# Patient Record
Sex: FEMALE | Race: WHITE | Employment: OTHER | URBAN - METROPOLITAN AREA
[De-identification: names, ages, dates, MRNs, and addresses within clinical notes are randomized per-mention and may not be internally consistent; named-entity substitution may affect disease eponyms.]

---

## 2020-02-01 ENCOUNTER — APPOINTMENT (OUTPATIENT)
Dept: GENERAL RADIOLOGY | Facility: CLINIC | Age: 75
DRG: 482 | End: 2020-02-01
Attending: EMERGENCY MEDICINE
Payer: COMMERCIAL

## 2020-02-01 ENCOUNTER — HOSPITAL ENCOUNTER (INPATIENT)
Facility: CLINIC | Age: 75
LOS: 3 days | Discharge: HOME HEALTH | DRG: 482 | End: 2020-02-04
Attending: EMERGENCY MEDICINE | Admitting: INTERNAL MEDICINE
Payer: COMMERCIAL

## 2020-02-01 DIAGNOSIS — S72.002A CLOSED FRACTURE OF NECK OF LEFT FEMUR, INITIAL ENCOUNTER (H): ICD-10-CM

## 2020-02-01 LAB
ANION GAP SERPL CALCULATED.3IONS-SCNC: 7 MMOL/L (ref 3–14)
APTT PPP: 28 SEC (ref 22–37)
BASOPHILS # BLD AUTO: 0.1 10E9/L (ref 0–0.2)
BASOPHILS NFR BLD AUTO: 0.7 %
BUN SERPL-MCNC: 22 MG/DL (ref 7–30)
CALCIUM SERPL-MCNC: 8.8 MG/DL (ref 8.5–10.1)
CHLORIDE SERPL-SCNC: 107 MMOL/L (ref 94–109)
CO2 SERPL-SCNC: 23 MMOL/L (ref 20–32)
CREAT SERPL-MCNC: 0.63 MG/DL (ref 0.52–1.04)
DIFFERENTIAL METHOD BLD: NORMAL
EOSINOPHIL # BLD AUTO: 0.1 10E9/L (ref 0–0.7)
EOSINOPHIL NFR BLD AUTO: 0.8 %
ERYTHROCYTE [DISTWIDTH] IN BLOOD BY AUTOMATED COUNT: 12.9 % (ref 10–15)
GFR SERPL CREATININE-BSD FRML MDRD: 88 ML/MIN/{1.73_M2}
GLUCOSE SERPL-MCNC: 111 MG/DL (ref 70–99)
HCT VFR BLD AUTO: 37.9 % (ref 35–47)
HGB BLD-MCNC: 12.2 G/DL (ref 11.7–15.7)
IMM GRANULOCYTES # BLD: 0 10E9/L (ref 0–0.4)
IMM GRANULOCYTES NFR BLD: 0.3 %
INR PPP: 0.98 (ref 0.86–1.14)
LYMPHOCYTES # BLD AUTO: 1 10E9/L (ref 0.8–5.3)
LYMPHOCYTES NFR BLD AUTO: 11.1 %
MCH RBC QN AUTO: 29 PG (ref 26.5–33)
MCHC RBC AUTO-ENTMCNC: 32.2 G/DL (ref 31.5–36.5)
MCV RBC AUTO: 90 FL (ref 78–100)
MONOCYTES # BLD AUTO: 0.6 10E9/L (ref 0–1.3)
MONOCYTES NFR BLD AUTO: 6.8 %
NEUTROPHILS # BLD AUTO: 7 10E9/L (ref 1.6–8.3)
NEUTROPHILS NFR BLD AUTO: 80.3 %
NRBC # BLD AUTO: 0 10*3/UL
NRBC BLD AUTO-RTO: 0 /100
PLATELET # BLD AUTO: 320 10E9/L (ref 150–450)
POTASSIUM SERPL-SCNC: 3.7 MMOL/L (ref 3.4–5.3)
RBC # BLD AUTO: 4.2 10E12/L (ref 3.8–5.2)
SODIUM SERPL-SCNC: 137 MMOL/L (ref 133–144)
WBC # BLD AUTO: 8.7 10E9/L (ref 4–11)

## 2020-02-01 PROCEDURE — 85730 THROMBOPLASTIN TIME PARTIAL: CPT | Performed by: EMERGENCY MEDICINE

## 2020-02-01 PROCEDURE — 73552 X-RAY EXAM OF FEMUR 2/>: CPT | Mod: LT

## 2020-02-01 PROCEDURE — 72170 X-RAY EXAM OF PELVIS: CPT

## 2020-02-01 PROCEDURE — 93005 ELECTROCARDIOGRAM TRACING: CPT

## 2020-02-01 PROCEDURE — 25800030 ZZH RX IP 258 OP 636: Performed by: INTERNAL MEDICINE

## 2020-02-01 PROCEDURE — 25000132 ZZH RX MED GY IP 250 OP 250 PS 637: Performed by: INTERNAL MEDICINE

## 2020-02-01 PROCEDURE — 40000275 ZZH STATISTIC RCP TIME EA 10 MIN

## 2020-02-01 PROCEDURE — 93010 ELECTROCARDIOGRAM REPORT: CPT | Performed by: INTERNAL MEDICINE

## 2020-02-01 PROCEDURE — 25000128 H RX IP 250 OP 636: Performed by: INTERNAL MEDICINE

## 2020-02-01 PROCEDURE — 84484 ASSAY OF TROPONIN QUANT: CPT | Performed by: INTERNAL MEDICINE

## 2020-02-01 PROCEDURE — 12000000 ZZH R&B MED SURG/OB

## 2020-02-01 PROCEDURE — 80048 BASIC METABOLIC PNL TOTAL CA: CPT | Performed by: EMERGENCY MEDICINE

## 2020-02-01 PROCEDURE — 99222 1ST HOSP IP/OBS MODERATE 55: CPT | Mod: AI | Performed by: INTERNAL MEDICINE

## 2020-02-01 PROCEDURE — 25000132 ZZH RX MED GY IP 250 OP 250 PS 637: Performed by: EMERGENCY MEDICINE

## 2020-02-01 PROCEDURE — 85610 PROTHROMBIN TIME: CPT | Performed by: EMERGENCY MEDICINE

## 2020-02-01 PROCEDURE — 85025 COMPLETE CBC W/AUTO DIFF WBC: CPT | Performed by: EMERGENCY MEDICINE

## 2020-02-01 PROCEDURE — 99285 EMERGENCY DEPT VISIT HI MDM: CPT | Mod: 25

## 2020-02-01 RX ORDER — LIDOCAINE 40 MG/G
CREAM TOPICAL
Status: DISCONTINUED | OUTPATIENT
Start: 2020-02-01 | End: 2020-02-02

## 2020-02-01 RX ORDER — ONDANSETRON 4 MG/1
4 TABLET, ORALLY DISINTEGRATING ORAL EVERY 6 HOURS PRN
Status: DISCONTINUED | OUTPATIENT
Start: 2020-02-01 | End: 2020-02-02

## 2020-02-01 RX ORDER — IBUPROFEN 200 MG
800 TABLET ORAL EVERY 8 HOURS PRN
COMMUNITY

## 2020-02-01 RX ORDER — SODIUM CHLORIDE 9 MG/ML
INJECTION, SOLUTION INTRAVENOUS CONTINUOUS
Status: DISCONTINUED | OUTPATIENT
Start: 2020-02-01 | End: 2020-02-01

## 2020-02-01 RX ORDER — CODEINE PHOSPHATE AND GUAIFENESIN 10; 100 MG/5ML; MG/5ML
10 SOLUTION ORAL EVERY 4 HOURS PRN
Status: ON HOLD | COMMUNITY
Start: 2020-01-27 | End: 2020-02-04

## 2020-02-01 RX ORDER — DEXTROSE, SODIUM CHLORIDE, AND POTASSIUM CHLORIDE 5; .45; .15 G/100ML; G/100ML; G/100ML
500 INJECTION INTRAVENOUS ONCE
Status: DISCONTINUED | OUTPATIENT
Start: 2020-02-01 | End: 2020-02-01

## 2020-02-01 RX ORDER — ONDANSETRON 2 MG/ML
4 INJECTION INTRAMUSCULAR; INTRAVENOUS EVERY 6 HOURS PRN
Status: DISCONTINUED | OUTPATIENT
Start: 2020-02-01 | End: 2020-02-02

## 2020-02-01 RX ORDER — OXYCODONE HYDROCHLORIDE 5 MG/1
5-10 TABLET ORAL
Status: DISCONTINUED | OUTPATIENT
Start: 2020-02-01 | End: 2020-02-02

## 2020-02-01 RX ORDER — DEXTROSE MONOHYDRATE, SODIUM CHLORIDE, AND POTASSIUM CHLORIDE 50; 1.49; 4.5 G/1000ML; G/1000ML; G/1000ML
INJECTION, SOLUTION INTRAVENOUS CONTINUOUS
Status: DISCONTINUED | OUTPATIENT
Start: 2020-02-01 | End: 2020-02-02

## 2020-02-01 RX ORDER — NALOXONE HYDROCHLORIDE 0.4 MG/ML
.1-.4 INJECTION, SOLUTION INTRAMUSCULAR; INTRAVENOUS; SUBCUTANEOUS
Status: DISCONTINUED | OUTPATIENT
Start: 2020-02-01 | End: 2020-02-02

## 2020-02-01 RX ORDER — HYDROMORPHONE HYDROCHLORIDE 1 MG/ML
.3-.5 INJECTION, SOLUTION INTRAMUSCULAR; INTRAVENOUS; SUBCUTANEOUS
Status: DISCONTINUED | OUTPATIENT
Start: 2020-02-01 | End: 2020-02-02

## 2020-02-01 RX ORDER — ACETAMINOPHEN 500 MG
1000 TABLET ORAL ONCE
Status: COMPLETED | OUTPATIENT
Start: 2020-02-01 | End: 2020-02-01

## 2020-02-01 RX ORDER — DOXYCYCLINE 100 MG/1
100 CAPSULE ORAL 2 TIMES DAILY
Status: ON HOLD | COMMUNITY
End: 2020-02-04

## 2020-02-01 RX ADMIN — POTASSIUM CHLORIDE, DEXTROSE MONOHYDRATE AND SODIUM CHLORIDE: 150; 5; 450 INJECTION, SOLUTION INTRAVENOUS at 14:28

## 2020-02-01 RX ADMIN — OXYCODONE HYDROCHLORIDE 5 MG: 5 TABLET ORAL at 19:00

## 2020-02-01 RX ADMIN — HYDROMORPHONE HYDROCHLORIDE 0.3 MG: 1 INJECTION, SOLUTION INTRAMUSCULAR; INTRAVENOUS; SUBCUTANEOUS at 22:14

## 2020-02-01 RX ADMIN — ONDANSETRON HYDROCHLORIDE 4 MG: 2 INJECTION, SOLUTION INTRAMUSCULAR; INTRAVENOUS at 20:21

## 2020-02-01 RX ADMIN — HYDROMORPHONE HYDROCHLORIDE 0.5 MG: 1 INJECTION, SOLUTION INTRAMUSCULAR; INTRAVENOUS; SUBCUTANEOUS at 17:02

## 2020-02-01 RX ADMIN — ACETAMINOPHEN 1000 MG: 500 TABLET, FILM COATED ORAL at 11:37

## 2020-02-01 RX ADMIN — HYDROMORPHONE HYDROCHLORIDE 0.5 MG: 1 INJECTION, SOLUTION INTRAMUSCULAR; INTRAVENOUS; SUBCUTANEOUS at 19:48

## 2020-02-01 ASSESSMENT — ACTIVITIES OF DAILY LIVING (ADL)
TOILETING: 0-->INDEPENDENT
WHICH_OF_THE_ABOVE_FUNCTIONAL_RISKS_HAD_A_RECENT_ONSET_OR_CHANGE?: AMBULATION;TRANSFERRING;TOILETING;BATHING;DRESSING
RETIRED_EATING: 0-->INDEPENDENT
AMBULATION: 0-->INDEPENDENT
COGNITION: 0 - NO COGNITION ISSUES REPORTED
ADLS_ACUITY_SCORE: 11
SWALLOWING: 0-->SWALLOWS FOODS/LIQUIDS WITHOUT DIFFICULTY
DRESS: 0-->INDEPENDENT
NUMBER_OF_TIMES_PATIENT_HAS_FALLEN_WITHIN_LAST_SIX_MONTHS: 1
RETIRED_COMMUNICATION: 0-->UNDERSTANDS/COMMUNICATES WITHOUT DIFFICULTY
BATHING: 0-->INDEPENDENT
FALL_HISTORY_WITHIN_LAST_SIX_MONTHS: YES
ADLS_ACUITY_SCORE: 13
TRANSFERRING: 0-->INDEPENDENT

## 2020-02-01 ASSESSMENT — ENCOUNTER SYMPTOMS
ABDOMINAL PAIN: 0
BACK PAIN: 0
NECK PAIN: 0

## 2020-02-01 NOTE — H&P
[facility] History and Physical    Kirti Ng MRN# [id number]   Age: 74 year old YOB: 1945     Date of Admission:  2/1/2020    Home clinic: [place], [place]          Assessment and Plan:   Assessment:   Kirti Ng is a fundamentally healthy woman from [place], [place] who is visitngg family and fell in the garage.  She sustained a left femoral neck fracture for which [facility] is now admitted in anticipation of surgical repair.      PMH is notable for the absence of major medical issues.  She has never undergone anesthetic and does not take any scheduled medications.  She does smoke but has never been told that she has lung disease.  She does tolerate daily exercise and has no hx exercise-induced CP, syncope or limiting HERNÁNDEZ.     Evaluation to this point indicates the patient is quite low risk for general endotracheal anesthetic.  She does smoke but has no known lung disease.  She has no problems with normal range of motion of her neck.  She does not wear dentures.  She has not had eye surgery but reports cataracts.  Examination of her heart, lungs and abdomen are normal without remarkable findings.  EKG shows a normal sinus rhythm with normal axes and intervals.  Creatinine 0.63, hemoglobin 12.2, white count 8.7, INR 0.98.    Dx:  1.  Left femoral neck fracture due to mechanical fall.   2.  Risks of perioperative cardiac complications related to general endotracheal anesthetic include no prior exposure but really nothing else.      Plan:   1.  Admit to inpatient.  2.  Orthopedic consultation has been obtained.  3.  N.p.o. after midnight.             Chief Complaint:   Fall, unable to bear weight on the left lower extremity     History is obtained from the patient, emergency room physician and electronic medical record.    Ms. Ng is reportedly visiting family from [facility].  She has generally been feeling very well and has not had any medical concerns recently.  She apparently  stepped on something in the garage and lost her footing.  She landed on her left side and was unable to bear weight on her left lower extremity.    In the emergency department, the patient is noted to have severe pain with any attempt at movement of the left lower extremity.  X-rays confirmed presence of a femoral neck fracture.          Past Medical History:   Ms. Ng denies major medical disease.  She does not take any medications on a regular basis.           Past Surgical History:   Denies prior surgeries.          Social History:     Social History     Tobacco Use     Smoking status: Not on file   Substance Use Topics     Alcohol use: Not on file             Family History:   Patient reports that her sister had exposure to general endotracheal anesthetic and had no difficulty with it.         Allergies:   No Known Allergies          Medications:     Medications Prior to Admission   Medication Sig Dispense Refill Last Dose     doxycycline monohydrate (MONODOX) 100 MG capsule Take 100 mg by mouth 2 times daily For 10 days. 2 doses remaining   1/31/2020 at pm     guaiFENesin-codeine (ROBITUSSIN AC) 100-10 MG/5ML solution Take 10 mLs by mouth every 4 hours as needed   1/31/2020 at am     ibuprofen (ADVIL/MOTRIN) 200 MG tablet Take 800 mg by mouth every 8 hours as needed for mild pain or moderate pain   2/1/2020 at am             Review of Systems:   A comprehensive review of systems was performed and found to be negative except as described in this note           Physical Exam:   Vitals were reviewed  Temp: 99.2  F (37.3  C) Temp src: Oral BP: (!) 146/59 Pulse: 75   Resp: 16 SpO2: 98 % O2 Device: None (Room air)    Constitutional: Awake, alert, cooperative, no apparent distress, and appears stated age.  Thin, elderly  female.  Fully appropriate.  Eyes: Lids and lashes normal, pupils equal, round and reactive to light, extra ocular muscles intact, sclera clear, conjunctiva normal.  ENT: Normocephalic,  without obvious abnormality, atraumatic.  Oral pharynx with moist mucus membranes, gums normal and good dentition.  Neck: Supple, symmetrical, trachea midline, no adenopathy, thyroid symmetric, not enlarged and no tenderness, skin normal.  Hematologic / Lymphatic: No cervical lymphadenopathy and no supraclavicular lymphadenopathy.  Lungs: No increased work of breathing, good air exchange, clear to auscultation bilaterally, no crackles or wheezing.  No significantly prolonged expiratory phase.  Cardiovascular: Regular rate and rhythm, normal S1 and S2, no S3 or S4, and no murmur noted.  Abdomen: No scars, normal bowel sounds, soft, non-distended, non-tender, no masses palpated, no hepatosplenomegaly.  Musculoskeletal: No redness, warmth, or swelling of the joints.  Tone is normal.  Neurologic: Awake, alert, oriented to name, place and time.  Cranial nerves II-XII are grossly intact.   Neuropsychiatric: Normal affect, mood, orientation, memory and insight.  Skin: No rashes, erythema, pallor, petechia or purpura.          Data:     Results for orders placed or performed during the hospital encounter of 02/01/20 (from the past 24 hour(s))   XR Femur Left 2 Views    Narrative    FEMUR TWO VIEWS LEFT  2/1/2020 12:08 PM     HISTORY: pain, eval for fracture    COMPARISON: None.      Impression    IMPRESSION: Acute nondisplaced left femoral neck fracture with  impaction laterally. There is normal hip joint spacing and alignment.  Osteopenia. Vascular calcification.    ARIAN KRUSE MD   XR Pelvis 1/2 Views    Narrative    XR PELVIS 1/2 VW   2/1/2020 12:10 PM     HISTORY:  pain, eval for fracture hip    COMPARISON: None      Impression    IMPRESSION: Acute nondisplaced left femoral neck fracture. There is  normal hip joint spacing and alignment. Mild degenerative changes of  the sacroiliac joints and lumbar spine. Vascular calcification.    ARIAN KRUSE MD   CBC with platelets differential   Result Value Ref Range     WBC 8.7 4.0 - 11.0 10e9/L    RBC Count 4.20 3.8 - 5.2 10e12/L    Hemoglobin 12.2 11.7 - 15.7 g/dL    Hematocrit 37.9 35.0 - 47.0 %    MCV 90 78 - 100 fl    MCH 29.0 26.5 - 33.0 pg    MCHC 32.2 31.5 - 36.5 g/dL    RDW 12.9 10.0 - 15.0 %    Platelet Count 320 150 - 450 10e9/L    Diff Method Automated Method     % Neutrophils 80.3 %    % Lymphocytes 11.1 %    % Monocytes 6.8 %    % Eosinophils 0.8 %    % Basophils 0.7 %    % Immature Granulocytes 0.3 %    Nucleated RBCs 0 0 /100    Absolute Neutrophil 7.0 1.6 - 8.3 10e9/L    Absolute Lymphocytes 1.0 0.8 - 5.3 10e9/L    Absolute Monocytes 0.6 0.0 - 1.3 10e9/L    Absolute Eosinophils 0.1 0.0 - 0.7 10e9/L    Absolute Basophils 0.1 0.0 - 0.2 10e9/L    Abs Immature Granulocytes 0.0 0 - 0.4 10e9/L    Absolute Nucleated RBC 0.0    INR   Result Value Ref Range    INR 0.98 0.86 - 1.14   Basic metabolic panel   Result Value Ref Range    Sodium 137 133 - 144 mmol/L    Potassium 3.7 3.4 - 5.3 mmol/L    Chloride 107 94 - 109 mmol/L    Carbon Dioxide 23 20 - 32 mmol/L    Anion Gap 7 3 - 14 mmol/L    Glucose 111 (H) 70 - 99 mg/dL    Urea Nitrogen 22 7 - 30 mg/dL    Creatinine 0.63 0.52 - 1.04 mg/dL    GFR Estimate 88 >60 mL/min/[1.73_m2]    GFR Estimate If Black >90 >60 mL/min/[1.73_m2]    Calcium 8.8 8.5 - 10.1 mg/dL   Partial thromboplastin time   Result Value Ref Range    PTT 28 22 - 37 sec   Orthopedic Surgery IP Consult: Patient to be seen: Routine within 24 hrs; left femoral neck fracture; Consultant may enter orders: Yes; Requesting provider? Hospitalist (if different from attending physician)    Karlos Woods MD     2/1/2020  3:09 PM  Chart and xray reviewed  Impacted FNF  A/P  I will see in am  I will speak w family tonight if possible by phone  Plan ORIF vs CRUZITO (lean ORIF as the fx is well aligned)   EKG 12-lead, tracing only   Result Value Ref Range    Interpretation ECG Click View Image link to view waveform and result       EKG results:   Performed  today        Normal sinus rhythm, normal axis, no acute ischemic ST segment or T wave changes      All imaging studies reviewed by me.      Attestation:  I have reviewed today's vital signs, notes, medications, labs and imaging.  Total time: 30 minutes     Rk Monroe MD

## 2020-02-01 NOTE — ED PROVIDER NOTES
History     Chief Complaint:  Fall      HPI   Kirti Ng is a 74 year old female who presents with fall. The patient notes that she was outside walking in the garage and had tripped over something and fell and landed on her left leg and hip. She denies hitting head, no chest abdominal, neck or back pain, or loss of consciousness. She states that she was not able to walk. She denies use of anticoagulants.     Allergies:  No known drug allergies     Medications:    Robitussin    Past Medical History:    The patient does not have any past pertinent medical history.     Past Surgical History:    The patient does not have any pertinent past surgical history.     Family History:    No past pertinent family history.     Social History:  Smoking status: Current every day smoker  Alcohol use: No  Drug use: No  PCP: Physician No Ref-Primary  Presents to the ED with son   Marital Status:  Not on file    Review of Systems   Cardiovascular: Negative for chest pain.   Gastrointestinal: Negative for abdominal pain.   Musculoskeletal: Negative for back pain and neck pain.        Left hip and leg pain   Neurological: Negative for syncope.   All other systems reviewed and are negative.        Physical Exam     Patient Vitals for the past 24 hrs:   BP Temp Temp src Pulse Resp SpO2   02/01/20 1055 (!) 166/75 98.4  F (36.9  C) Temporal 78 16 98 %        Physical Exam  General: Patient is alert and interactive when I enter the room  Head:  The scalp, face, and head appear normal. Atraumatic.   Eyes:  The pupils are equal, round, and reactive to light    Conjunctivae and sclerae are normal  ENT:    No hemotympanum or signs basilar skull fracture    The oropharynx is normal without erythema.     Uvula is in the midline. Midface stable to palpation.   Neck:  Normal range of motion  CV:  Regular rate. S1/S2. No murmurs.  The right and left dorsalis pedis and posterior tibial pulses are normal.  Resp:  Lungs are clear without wheezes or  "rales. No distress. No crepitance.   GI:  Abdomen is soft, no rigidity, guarding, or rebound. No contusion.    No distension. No tenderness to palpation in any quadrant.     MS:  Normal tone. Joints grossly normal without effusions.     No asymmetric leg swelling, calf or thigh tenderness.      Normal motor assessment of all extremities.    PROM performed of all major joints without pain except left hip which is painful in rotation and flexion.  The knee and the ankle appear normal..    No C/T/L tenderness in midline or laterally, spines cleared     after no imaging.  Strong ankle dorsiflexion and toe extension on the left side.  No chest wall tenderness present.    Skin:  No rash or lesions noted. Normal capillary refill noted  Neuro:  Speech is normal and fluent. Face is symmetric.     Moving all extremities well. Strength is normal and symmetric.     GCS 15.  CN\"s II-XII intact.    Psych: Awake. Alert.  Normal affect.  Appropriate interactions.  Lymph: No anterior cervical lymphadenopathy noted        Emergency Department Course     Imaging:  Radiology findings were communicated with the patient who voiced understanding of the findings.    XR femur Left:  Acute nondisplaced left femoral neck fracture with  impaction laterally. There is normal hip joint spacing and alignment.  Osteopenia. Vascular calcification    [place] per radiology     XR Pelvis:   Acute nondisplaced left femoral neck fracture. There is  normal hip joint spacing and alignment. Mild degenerative changes of  the sacroiliac joints and lumbar spine. Vascular calcification.    [place] per radiology       Procedures    Interventions:  1137 Acetaminophen, 1000 mg, PO    Emergency Department Course:   Nursing notes and vitals reviewed.    1123 I performed an exam of the patient as documented above.     1150 The patient was sent for a Femur and Pelvis XR while in the emergency department, results above.      1210 I personally reviewed the results with " the patient and her son and answered all related questions prior to admission.    1233  I spoke to Dr. Monroe of the hospitalist service who accepts the patient for admission.    1300 I spoke with the orthopedic service from [facility] regarding patient's presentation, findings, and plan of care.       Impression & Plan      Medical Decision Making:  Kirti Ng is a 74 year old female who presents for evaluation of left hip pain after fall. CMS is intact distally in the extremity.  Xrays reveal a fracture of the hip that will need orthopedic consultation and likely surgery.  The patients head to toe trauma exam is otherwise normal at this time and no further trauma workup is needed as I believe there is no signs of serious head, neck, chest, spinal, extremity or abdominal injuries.   Will admit to medicine for further cares and ortho consultation.  Pain control achieved.      Diagnosis:    ICD-10-CM    1. Closed fracture of neck of left femur, initial encounter (H) S72.002A        Disposition:   The patient is admitted into the care of Dr. Monroe.     Scribe Disclosure:  I, Lluvia Junior, am serving as a scribe at 1123 on 2/1/2020 to document services personally performed by Stewart Mcclelland MD based on my observations and the provider's statements to me.   [facility] EMERGENCY DEPARTMENT       Stewart Mcclelland MD  02/01/20 8018

## 2020-02-01 NOTE — ED TRIAGE NOTES
Patient in garage this morning around 0930 this morning and felling injuring right hip/upper leg. ABC's intact.

## 2020-02-01 NOTE — ED NOTES
[facility]  ED Nurse Handoff Report    Kirti Ng is a 74 year old female   ED Chief complaint: Fall  . ED Diagnosis:   Final diagnoses:   Closed fracture of neck of left femur, initial encounter (H)     Allergies: No Known Allergies    Code Status: Full Code  Activity level - Baseline/Home:  Independent. Activity Level - Current:   Stand by Assist. Lift room needed: No. Bariatric: No   Needed: No   Isolation: No. Infection: Not Applicable.     Vital Signs:   Vitals:    02/01/20 1055   BP: (!) 166/75   Pulse: 78   Resp: 16   Temp: 98.4  F (36.9  C)   TempSrc: Temporal   SpO2: 98%       Cardiac Rhythm:  ,      Pain level:    Patient confused: No. Patient Falls Risk: Yes.   Elimination Status: Has voided   Patient Report - Initial Complaint: fall, hip pain. Focused Assessment: fell on ice today. Pain in hip. CMS intact.    Tests Performed: xray. Abnormal Results:   XR Pelvis 1/2 Views   Final Result   IMPRESSION: Acute nondisplaced left femoral neck fracture. There is   normal hip joint spacing and alignment. Mild degenerative changes of   the sacroiliac joints and lumbar spine. Vascular calcification.      ARIAN KRUSE MD      XR Femur Left 2 Views   Final Result   IMPRESSION: Acute nondisplaced left femoral neck fracture with   impaction laterally. There is normal hip joint spacing and alignment.   Osteopenia. Vascular calcification.      ARIAN KRUSE MD      .   Treatments provided: see MAR  Family Comments: not present at this time  OBS brochure/video discussed/provided to patient:  N/A  ED Medications:   Medications   acetaminophen (TYLENOL) tablet 1,000 mg (1,000 mg Oral Given 2/1/20 1137)     Drips infusing:  No  For the majority of the shift, the patient's behavior Green. Interventions performed were reinforced plan of care.     Severe Sepsis OR Septic Shock Diagnosis Present: No      ED Nurse Name/Phone Number: Lluvia Echols RN,   12:35 PM    RECEIVING UNIT ED HANDOFF  REVIEW    Above ED Nurse Handoff Report was reviewed: Yes  Reviewed by: Laine Grace RN on February 1, 2020 at 1:00 PM

## 2020-02-01 NOTE — CONSULTS
Chart and xray reviewed  Impacted FNF  A/P  I will see in am  I will speak w family tonight if possible by phone  Plan ORIF vs CRUZITO (lean ORIF as the fx is well aligned)

## 2020-02-01 NOTE — PHARMACY-ADMISSION MEDICATION HISTORY
Admission medication history interview status for this patient is complete. See [facility] admission navigator for allergy information, prior to admission medications and immunization status.     Medication history interview source(s):Patient and Pharmacy ([facility] [place],  [phone])  Medication history resources (including written lists, pill bottles, clinic record): SureScripts and Care Everywhere  Primary pharmacy: [facility] [id number], [place] [address]    Changes made to PTA medication list:  Added: robitussin, doxycycline, ibuprofen  Deleted: none  Changed: none    Actions taken by pharmacist (provider contacted, etc): Called pharmacy to determine antibiotic - patient was unaware of the name.     Additional medication history information:None    Medication reconciliation/reorder completed by provider prior to medication history?  No     Do you take OTC medications (eg tylenol, ibuprofen, fish oil, eye/ear drops, etc)? Yes - see list       Prior to Admission medications    Medication Sig Last Dose Taking? Auth Provider   doxycycline monohydrate (MONODOX) 100 MG capsule Take 100 mg by mouth 2 times daily For 10 days. 2 doses remaining 1/31/2020 at pm Yes Unknown, Entered By History   guaiFENesin-codeine (ROBITUSSIN AC) 100-10 MG/5ML solution Take 10 mLs by mouth every 4 hours as needed 1/31/2020 at am Yes Unknown, Entered By History   ibuprofen (ADVIL/MOTRIN) 200 MG tablet Take 800 mg by mouth every 8 hours as needed for mild pain or moderate pain 2/1/2020 at am Yes Unknown, Entered By History

## 2020-02-01 NOTE — PROGRESS NOTES
"SPIRITUAL HEALTH SERVICES Progress Note  [facility] 6th floor ortho    Visited pt per admittance request.  Her son Sg was present in the room.  Pt is here visiting her son and family from out of town.  She is visibly upset with herself for the fall that caused the hip fracture. She will be having surgery tomorrow.  The pt said, \"maybe God will help me.\"  I reassured that pt that He will walk this journey with her.  Pt identifies as [employer]ism.  The offer for prayer was welcomed.    Told pt how to rq SH during her stay or prior to surgery.    SH remains available p/pt request.        Linda Richards   Intern    "

## 2020-02-01 NOTE — PLAN OF CARE
Arrived to room 621 from ER at 1330 via cart, alert and oriented x 4, oriented to room and call system, IV patent and infusing, pt DTV; bladder scanned for 267 ml @ 1445, rates pain 5/10-declines intervention, reviewed welcome folder and pain/medication information packet with patient and son. Admission profile completed.

## 2020-02-02 ENCOUNTER — ANESTHESIA (OUTPATIENT)
Dept: SURGERY | Facility: CLINIC | Age: 75
DRG: 482 | End: 2020-02-02
Payer: COMMERCIAL

## 2020-02-02 ENCOUNTER — ANESTHESIA EVENT (OUTPATIENT)
Dept: SURGERY | Facility: CLINIC | Age: 75
DRG: 482 | End: 2020-02-02
Payer: COMMERCIAL

## 2020-02-02 ENCOUNTER — APPOINTMENT (OUTPATIENT)
Dept: GENERAL RADIOLOGY | Facility: CLINIC | Age: 75
DRG: 482 | End: 2020-02-02
Attending: INTERNAL MEDICINE
Payer: COMMERCIAL

## 2020-02-02 PROBLEM — S72.009A HIP FRACTURE REQUIRING OPERATIVE REPAIR (H): Status: ACTIVE | Noted: 2020-02-02

## 2020-02-02 LAB
ANION GAP SERPL CALCULATED.3IONS-SCNC: 6 MMOL/L (ref 3–14)
BUN SERPL-MCNC: 12 MG/DL (ref 7–30)
CALCIUM SERPL-MCNC: 8.1 MG/DL (ref 8.5–10.1)
CHLORIDE SERPL-SCNC: 109 MMOL/L (ref 94–109)
CO2 SERPL-SCNC: 22 MMOL/L (ref 20–32)
CREAT SERPL-MCNC: 0.54 MG/DL (ref 0.52–1.04)
GFR SERPL CREATININE-BSD FRML MDRD: >90 ML/MIN/{1.73_M2}
GLUCOSE SERPL-MCNC: 122 MG/DL (ref 70–99)
HGB BLD-MCNC: 11.4 G/DL (ref 11.7–15.7)
POTASSIUM SERPL-SCNC: 3.4 MMOL/L (ref 3.4–5.3)
SODIUM SERPL-SCNC: 137 MMOL/L (ref 133–144)

## 2020-02-02 PROCEDURE — 25800030 ZZH RX IP 258 OP 636: Performed by: ANESTHESIOLOGY

## 2020-02-02 PROCEDURE — 12000000 ZZH R&B MED SURG/OB

## 2020-02-02 PROCEDURE — 27211024 ZZHC OR SUPPLY OTHER OPNP: Performed by: ORTHOPAEDIC SURGERY

## 2020-02-02 PROCEDURE — 80048 BASIC METABOLIC PNL TOTAL CA: CPT | Performed by: INTERNAL MEDICINE

## 2020-02-02 PROCEDURE — C1713 ANCHOR/SCREW BN/BN,TIS/BN: HCPCS | Performed by: ORTHOPAEDIC SURGERY

## 2020-02-02 PROCEDURE — 85018 HEMOGLOBIN: CPT | Performed by: INTERNAL MEDICINE

## 2020-02-02 PROCEDURE — 25000128 H RX IP 250 OP 636: Performed by: ANESTHESIOLOGY

## 2020-02-02 PROCEDURE — 25000132 ZZH RX MED GY IP 250 OP 250 PS 637: Performed by: INTERNAL MEDICINE

## 2020-02-02 PROCEDURE — 0QS704Z REPOSITION LEFT UPPER FEMUR WITH INTERNAL FIXATION DEVICE, OPEN APPROACH: ICD-10-PCS | Performed by: ORTHOPAEDIC SURGERY

## 2020-02-02 PROCEDURE — 40000306 ZZH STATISTIC PRE PROC ASSESS II: Performed by: ORTHOPAEDIC SURGERY

## 2020-02-02 PROCEDURE — 71000012 ZZH RECOVERY PHASE 1 LEVEL 1 FIRST HR: Performed by: ORTHOPAEDIC SURGERY

## 2020-02-02 PROCEDURE — 25000128 H RX IP 250 OP 636: Performed by: ORTHOPAEDIC SURGERY

## 2020-02-02 PROCEDURE — 25000125 ZZHC RX 250: Performed by: NURSE ANESTHETIST, CERTIFIED REGISTERED

## 2020-02-02 PROCEDURE — 25000125 ZZHC RX 250: Performed by: ORTHOPAEDIC SURGERY

## 2020-02-02 PROCEDURE — 37000008 ZZH ANESTHESIA TECHNICAL FEE, 1ST 30 MIN: Performed by: ORTHOPAEDIC SURGERY

## 2020-02-02 PROCEDURE — 25000128 H RX IP 250 OP 636: Performed by: PHYSICIAN ASSISTANT

## 2020-02-02 PROCEDURE — 71000013 ZZH RECOVERY PHASE 1 LEVEL 1 EA ADDTL HR: Performed by: ORTHOPAEDIC SURGERY

## 2020-02-02 PROCEDURE — 40000277 XR SURGERY CARM FLUORO LESS THAN 5 MIN W STILLS: Mod: TC

## 2020-02-02 PROCEDURE — 36000063 ZZH SURGERY LEVEL 4 EA 15 ADDTL MIN: Performed by: ORTHOPAEDIC SURGERY

## 2020-02-02 PROCEDURE — 25000132 ZZH RX MED GY IP 250 OP 250 PS 637: Performed by: ORTHOPAEDIC SURGERY

## 2020-02-02 PROCEDURE — 36000065 ZZH SURGERY LEVEL 4 W FLUORO 1ST 30 MIN: Performed by: ORTHOPAEDIC SURGERY

## 2020-02-02 PROCEDURE — 37000009 ZZH ANESTHESIA TECHNICAL FEE, EACH ADDTL 15 MIN: Performed by: ORTHOPAEDIC SURGERY

## 2020-02-02 PROCEDURE — 36415 COLL VENOUS BLD VENIPUNCTURE: CPT | Performed by: INTERNAL MEDICINE

## 2020-02-02 PROCEDURE — 25000128 H RX IP 250 OP 636: Performed by: NURSE ANESTHETIST, CERTIFIED REGISTERED

## 2020-02-02 PROCEDURE — 25000128 H RX IP 250 OP 636: Performed by: INTERNAL MEDICINE

## 2020-02-02 PROCEDURE — 27210794 ZZH OR GENERAL SUPPLY STERILE: Performed by: ORTHOPAEDIC SURGERY

## 2020-02-02 PROCEDURE — 25800030 ZZH RX IP 258 OP 636: Performed by: INTERNAL MEDICINE

## 2020-02-02 PROCEDURE — 99207 ZZC CDG-DOWN CODE MED NECESSITY: CPT | Performed by: INTERNAL MEDICINE

## 2020-02-02 PROCEDURE — 25000132 ZZH RX MED GY IP 250 OP 250 PS 637: Performed by: PHYSICIAN ASSISTANT

## 2020-02-02 PROCEDURE — 25000125 ZZHC RX 250: Performed by: PHYSICIAN ASSISTANT

## 2020-02-02 DEVICE — IMP SCR SYN CORTEX 4.5X40MM SELF TAP SS 214.840: Type: IMPLANTABLE DEVICE | Site: FEMUR | Status: FUNCTIONAL

## 2020-02-02 DEVICE — IMPLANTABLE DEVICE: Type: IMPLANTABLE DEVICE | Site: HIP | Status: FUNCTIONAL

## 2020-02-02 DEVICE — IMP SCR SYN CORTEX 4.5X42MM SELF TAP SS 214.842: Type: IMPLANTABLE DEVICE | Site: FEMUR | Status: FUNCTIONAL

## 2020-02-02 DEVICE — IMP SCR SYN DHS/DCS LAG 12.7X85MM 1 STEP SS 280.285: Type: IMPLANTABLE DEVICE | Site: FEMUR | Status: FUNCTIONAL

## 2020-02-02 RX ORDER — DIPHENHYDRAMINE HYDROCHLORIDE 50 MG/ML
12.5 INJECTION INTRAMUSCULAR; INTRAVENOUS EVERY 6 HOURS PRN
Status: DISCONTINUED | OUTPATIENT
Start: 2020-02-02 | End: 2020-02-04 | Stop reason: HOSPADM

## 2020-02-02 RX ORDER — ACETAMINOPHEN 500 MG
1000 TABLET ORAL ONCE
Status: COMPLETED | OUTPATIENT
Start: 2020-02-02 | End: 2020-02-02

## 2020-02-02 RX ORDER — CEFAZOLIN SODIUM 1 G/50ML
1 INJECTION, SOLUTION INTRAVENOUS EVERY 8 HOURS
Status: COMPLETED | OUTPATIENT
Start: 2020-02-02 | End: 2020-02-03

## 2020-02-02 RX ORDER — LIDOCAINE HYDROCHLORIDE 10 MG/ML
INJECTION, SOLUTION INFILTRATION; PERINEURAL PRN
Status: DISCONTINUED | OUTPATIENT
Start: 2020-02-02 | End: 2020-02-02

## 2020-02-02 RX ORDER — FENTANYL CITRATE 50 UG/ML
50 INJECTION, SOLUTION INTRAMUSCULAR; INTRAVENOUS
Status: ACTIVE | OUTPATIENT
Start: 2020-02-02 | End: 2020-02-03

## 2020-02-02 RX ORDER — ONDANSETRON 2 MG/ML
4 INJECTION INTRAMUSCULAR; INTRAVENOUS EVERY 30 MIN PRN
Status: DISCONTINUED | OUTPATIENT
Start: 2020-02-02 | End: 2020-02-02 | Stop reason: HOSPADM

## 2020-02-02 RX ORDER — OXYCODONE HYDROCHLORIDE 5 MG/1
5-10 TABLET ORAL EVERY 4 HOURS PRN
Status: DISCONTINUED | OUTPATIENT
Start: 2020-02-02 | End: 2020-02-04 | Stop reason: HOSPADM

## 2020-02-02 RX ORDER — ACETAMINOPHEN 325 MG/1
975 TABLET ORAL EVERY 8 HOURS
Status: DISCONTINUED | OUTPATIENT
Start: 2020-02-02 | End: 2020-02-04 | Stop reason: HOSPADM

## 2020-02-02 RX ORDER — AMOXICILLIN 250 MG
1 CAPSULE ORAL 2 TIMES DAILY
Status: DISCONTINUED | OUTPATIENT
Start: 2020-02-02 | End: 2020-02-04 | Stop reason: HOSPADM

## 2020-02-02 RX ORDER — BUPIVACAINE HYDROCHLORIDE AND EPINEPHRINE 2.5; 5 MG/ML; UG/ML
1-30 INJECTION, SOLUTION EPIDURAL; INFILTRATION; INTRACAUDAL; PERINEURAL ONCE
Status: DISCONTINUED | OUTPATIENT
Start: 2020-02-02 | End: 2020-02-04 | Stop reason: HOSPADM

## 2020-02-02 RX ORDER — ACETAMINOPHEN 325 MG/1
650 TABLET ORAL EVERY 4 HOURS PRN
Status: DISCONTINUED | OUTPATIENT
Start: 2020-02-05 | End: 2020-02-04 | Stop reason: HOSPADM

## 2020-02-02 RX ORDER — SODIUM CHLORIDE, SODIUM LACTATE, POTASSIUM CHLORIDE, CALCIUM CHLORIDE 600; 310; 30; 20 MG/100ML; MG/100ML; MG/100ML; MG/100ML
INJECTION, SOLUTION INTRAVENOUS CONTINUOUS
Status: DISCONTINUED | OUTPATIENT
Start: 2020-02-02 | End: 2020-02-02 | Stop reason: HOSPADM

## 2020-02-02 RX ORDER — PROPOFOL 10 MG/ML
INJECTION, EMULSION INTRAVENOUS PRN
Status: DISCONTINUED | OUTPATIENT
Start: 2020-02-02 | End: 2020-02-02

## 2020-02-02 RX ORDER — ONDANSETRON 2 MG/ML
4 INJECTION INTRAMUSCULAR; INTRAVENOUS EVERY 6 HOURS PRN
Status: DISCONTINUED | OUTPATIENT
Start: 2020-02-02 | End: 2020-02-04 | Stop reason: HOSPADM

## 2020-02-02 RX ORDER — SODIUM CHLORIDE, SODIUM LACTATE, POTASSIUM CHLORIDE, CALCIUM CHLORIDE 600; 310; 30; 20 MG/100ML; MG/100ML; MG/100ML; MG/100ML
INJECTION, SOLUTION INTRAVENOUS CONTINUOUS
Status: DISCONTINUED | OUTPATIENT
Start: 2020-02-02 | End: 2020-02-04 | Stop reason: HOSPADM

## 2020-02-02 RX ORDER — LANOLIN ALCOHOL/MO/W.PET/CERES
3-5 CREAM (GRAM) TOPICAL
Status: DISCONTINUED | OUTPATIENT
Start: 2020-02-03 | End: 2020-02-04 | Stop reason: HOSPADM

## 2020-02-02 RX ORDER — BUPIVACAINE HYDROCHLORIDE AND EPINEPHRINE 2.5; 5 MG/ML; UG/ML
INJECTION, SOLUTION INFILTRATION; PERINEURAL PRN
Status: DISCONTINUED | OUTPATIENT
Start: 2020-02-02 | End: 2020-02-02 | Stop reason: HOSPADM

## 2020-02-02 RX ORDER — CEFAZOLIN SODIUM 2 G/100ML
2 INJECTION, SOLUTION INTRAVENOUS
Status: COMPLETED | OUTPATIENT
Start: 2020-02-02 | End: 2020-02-02

## 2020-02-02 RX ORDER — LIDOCAINE 40 MG/G
CREAM TOPICAL
Status: DISCONTINUED | OUTPATIENT
Start: 2020-02-02 | End: 2020-02-02 | Stop reason: HOSPADM

## 2020-02-02 RX ORDER — DIPHENHYDRAMINE HCL 12.5MG/5ML
12.5 LIQUID (ML) ORAL EVERY 6 HOURS PRN
Status: DISCONTINUED | OUTPATIENT
Start: 2020-02-02 | End: 2020-02-04 | Stop reason: HOSPADM

## 2020-02-02 RX ORDER — AMOXICILLIN 250 MG
2 CAPSULE ORAL 2 TIMES DAILY
Status: DISCONTINUED | OUTPATIENT
Start: 2020-02-02 | End: 2020-02-04 | Stop reason: HOSPADM

## 2020-02-02 RX ORDER — LIDOCAINE 40 MG/G
CREAM TOPICAL
Status: DISCONTINUED | OUTPATIENT
Start: 2020-02-02 | End: 2020-02-04 | Stop reason: HOSPADM

## 2020-02-02 RX ORDER — ONDANSETRON 4 MG/1
4 TABLET, ORALLY DISINTEGRATING ORAL EVERY 6 HOURS PRN
Status: DISCONTINUED | OUTPATIENT
Start: 2020-02-02 | End: 2020-02-04 | Stop reason: HOSPADM

## 2020-02-02 RX ORDER — ONDANSETRON 4 MG/1
4 TABLET, ORALLY DISINTEGRATING ORAL EVERY 30 MIN PRN
Status: DISCONTINUED | OUTPATIENT
Start: 2020-02-02 | End: 2020-02-02 | Stop reason: HOSPADM

## 2020-02-02 RX ORDER — EPHEDRINE SULFATE 50 MG/ML
INJECTION, SOLUTION INTRAMUSCULAR; INTRAVENOUS; SUBCUTANEOUS PRN
Status: DISCONTINUED | OUTPATIENT
Start: 2020-02-02 | End: 2020-02-02

## 2020-02-02 RX ORDER — NALOXONE HYDROCHLORIDE 0.4 MG/ML
.1-.4 INJECTION, SOLUTION INTRAMUSCULAR; INTRAVENOUS; SUBCUTANEOUS
Status: DISCONTINUED | OUTPATIENT
Start: 2020-02-02 | End: 2020-02-04 | Stop reason: HOSPADM

## 2020-02-02 RX ORDER — HYDROMORPHONE HYDROCHLORIDE 1 MG/ML
.3-.5 INJECTION, SOLUTION INTRAMUSCULAR; INTRAVENOUS; SUBCUTANEOUS
Status: DISCONTINUED | OUTPATIENT
Start: 2020-02-02 | End: 2020-02-04 | Stop reason: HOSPADM

## 2020-02-02 RX ORDER — DEXAMETHASONE SODIUM PHOSPHATE 4 MG/ML
INJECTION, SOLUTION INTRA-ARTICULAR; INTRALESIONAL; INTRAMUSCULAR; INTRAVENOUS; SOFT TISSUE PRN
Status: DISCONTINUED | OUTPATIENT
Start: 2020-02-02 | End: 2020-02-02

## 2020-02-02 RX ORDER — FENTANYL CITRATE 50 UG/ML
25-50 INJECTION, SOLUTION INTRAMUSCULAR; INTRAVENOUS
Status: DISCONTINUED | OUTPATIENT
Start: 2020-02-02 | End: 2020-02-02 | Stop reason: HOSPADM

## 2020-02-02 RX ORDER — LABETALOL 20 MG/4 ML (5 MG/ML) INTRAVENOUS SYRINGE
10
Status: DISCONTINUED | OUTPATIENT
Start: 2020-02-02 | End: 2020-02-02 | Stop reason: HOSPADM

## 2020-02-02 RX ORDER — PROCHLORPERAZINE MALEATE 5 MG
5 TABLET ORAL EVERY 6 HOURS PRN
Status: DISCONTINUED | OUTPATIENT
Start: 2020-02-02 | End: 2020-02-04 | Stop reason: HOSPADM

## 2020-02-02 RX ORDER — NALOXONE HYDROCHLORIDE 0.4 MG/ML
.1-.4 INJECTION, SOLUTION INTRAMUSCULAR; INTRAVENOUS; SUBCUTANEOUS
Status: ACTIVE | OUTPATIENT
Start: 2020-02-02 | End: 2020-02-03

## 2020-02-02 RX ORDER — ONDANSETRON 2 MG/ML
INJECTION INTRAMUSCULAR; INTRAVENOUS PRN
Status: DISCONTINUED | OUTPATIENT
Start: 2020-02-02 | End: 2020-02-02

## 2020-02-02 RX ORDER — CEFAZOLIN SODIUM 1 G/3ML
1 INJECTION, POWDER, FOR SOLUTION INTRAMUSCULAR; INTRAVENOUS SEE ADMIN INSTRUCTIONS
Status: DISCONTINUED | OUTPATIENT
Start: 2020-02-02 | End: 2020-02-02 | Stop reason: HOSPADM

## 2020-02-02 RX ADMIN — Medication 1 G: at 12:20

## 2020-02-02 RX ADMIN — OXYCODONE HYDROCHLORIDE 10 MG: 5 TABLET ORAL at 08:43

## 2020-02-02 RX ADMIN — LIDOCAINE HYDROCHLORIDE 30 MG: 10 INJECTION, SOLUTION INFILTRATION; PERINEURAL at 12:11

## 2020-02-02 RX ADMIN — CEFAZOLIN SODIUM 2 G: 2 INJECTION, SOLUTION INTRAVENOUS at 12:05

## 2020-02-02 RX ADMIN — HYDROMORPHONE HYDROCHLORIDE 0.5 MG: 1 INJECTION, SOLUTION INTRAMUSCULAR; INTRAVENOUS; SUBCUTANEOUS at 04:42

## 2020-02-02 RX ADMIN — DEXAMETHASONE SODIUM PHOSPHATE 4 MG: 4 INJECTION, SOLUTION INTRA-ARTICULAR; INTRALESIONAL; INTRAMUSCULAR; INTRAVENOUS; SOFT TISSUE at 12:21

## 2020-02-02 RX ADMIN — CEFAZOLIN SODIUM 1 G: 1 INJECTION, SOLUTION INTRAVENOUS at 19:42

## 2020-02-02 RX ADMIN — Medication 10 MG: at 12:20

## 2020-02-02 RX ADMIN — FENTANYL CITRATE 50 MCG: 50 INJECTION, SOLUTION INTRAMUSCULAR; INTRAVENOUS at 12:55

## 2020-02-02 RX ADMIN — ONDANSETRON HYDROCHLORIDE 4 MG: 2 INJECTION, SOLUTION INTRAMUSCULAR; INTRAVENOUS at 02:36

## 2020-02-02 RX ADMIN — FENTANYL CITRATE 100 MCG: 50 INJECTION, SOLUTION INTRAMUSCULAR; INTRAVENOUS at 12:11

## 2020-02-02 RX ADMIN — HYDROMORPHONE HYDROCHLORIDE 0.5 MG: 1 INJECTION, SOLUTION INTRAMUSCULAR; INTRAVENOUS; SUBCUTANEOUS at 07:46

## 2020-02-02 RX ADMIN — PROPOFOL 120 MG: 10 INJECTION, EMULSION INTRAVENOUS at 12:11

## 2020-02-02 RX ADMIN — HYDROMORPHONE HYDROCHLORIDE 0.5 MG: 1 INJECTION, SOLUTION INTRAMUSCULAR; INTRAVENOUS; SUBCUTANEOUS at 02:36

## 2020-02-02 RX ADMIN — ONDANSETRON HYDROCHLORIDE 4 MG: 2 INJECTION, SOLUTION INTRAVENOUS at 12:32

## 2020-02-02 RX ADMIN — POTASSIUM CHLORIDE, DEXTROSE MONOHYDRATE AND SODIUM CHLORIDE: 150; 5; 450 INJECTION, SOLUTION INTRAVENOUS at 03:39

## 2020-02-02 RX ADMIN — SENNOSIDES AND DOCUSATE SODIUM 1 TABLET: 8.6; 5 TABLET ORAL at 19:42

## 2020-02-02 RX ADMIN — ONDANSETRON HYDROCHLORIDE 4 MG: 2 INJECTION, SOLUTION INTRAMUSCULAR; INTRAVENOUS at 08:45

## 2020-02-02 RX ADMIN — ASPIRIN 325 MG: 325 TABLET, DELAYED RELEASE ORAL at 17:23

## 2020-02-02 RX ADMIN — ACETAMINOPHEN 975 MG: 325 TABLET, FILM COATED ORAL at 19:41

## 2020-02-02 RX ADMIN — SODIUM CHLORIDE, POTASSIUM CHLORIDE, SODIUM LACTATE AND CALCIUM CHLORIDE: 600; 310; 30; 20 INJECTION, SOLUTION INTRAVENOUS at 12:05

## 2020-02-02 RX ADMIN — HYDROMORPHONE HYDROCHLORIDE 0.5 MG: 1 INJECTION, SOLUTION INTRAMUSCULAR; INTRAVENOUS; SUBCUTANEOUS at 14:19

## 2020-02-02 RX ADMIN — ACETAMINOPHEN 1000 MG: 500 TABLET, FILM COATED ORAL at 11:42

## 2020-02-02 ASSESSMENT — ACTIVITIES OF DAILY LIVING (ADL)
ADLS_ACUITY_SCORE: 13
ADLS_ACUITY_SCORE: 13
ADLS_ACUITY_SCORE: 18
ADLS_ACUITY_SCORE: 13
ADLS_ACUITY_SCORE: 15

## 2020-02-02 ASSESSMENT — LIFESTYLE VARIABLES: TOBACCO_USE: 1

## 2020-02-02 ASSESSMENT — ENCOUNTER SYMPTOMS
DYSRHYTHMIAS: 0
SEIZURES: 0

## 2020-02-02 NOTE — ANESTHESIA CARE TRANSFER NOTE
Patient: Kirti Ng    Procedure(s):  INTERNAL FIXATION, FRACTURE, TROCHANTERIC, HIP, USING PINS OR RODS    Diagnosis: Hip fracture (H) [S72.009A]  Diagnosis Additional Information: No value filed.    Anesthesia Type:   General, LMA     Note:  Airway :Face Mask  Patient transferred to:PACU  Comments: To PACU, report to RN, oxygen per face mask.      Vitals: (Last set prior to Anesthesia Care Transfer)    CRNA VITALS  2/2/2020 1252 - 2/2/2020 1329      2/2/2020             EKG:  NSR                Electronically Signed By: VAMSHI Fam CRNA  February 2, 2020  1:29 PM

## 2020-02-02 NOTE — ANESTHESIA POSTPROCEDURE EVALUATION
Patient: Kirti Ng    Procedure(s):  INTERNAL FIXATION, FRACTURE, TROCHANTERIC, HIP, USING PINS OR RODS    Diagnosis:Hip fracture (H) [S72.009A]  Diagnosis Additional Information: No value filed.    Anesthesia Type:  General, LMA    Note:  Anesthesia Post Evaluation    Patient location during evaluation: PACU  Patient participation: Able to fully participate in evaluation  Level of consciousness: awake  Pain management: adequate  Airway patency: patent  Cardiovascular status: acceptable  Respiratory status: acceptable  Hydration status: euvolemic  PONV: controlled     Anesthetic complications: None          Last vitals:  Vitals:    02/02/20 1340 02/02/20 1345 02/02/20 1400   BP: (!) 166/83 (!) 166/83 (!) 161/76   Pulse: 91     Resp: 12 9 13   Temp:      SpO2: 100% 100% 96%         Electronically Signed By: Landen Shipman MD  February 2, 2020  2:19 PM

## 2020-02-02 NOTE — PLAN OF CARE
Arrived back to room 621 from PACU at 1430 via bed, alert and oriented x 4, oriented to room and call system, dressing CDI, CMS intact, IV patent and infusing, benoit patent, rates pain 0/10, SCD's on BLE. Raghavendra clear liquids well. Frequent VS started.

## 2020-02-02 NOTE — ANESTHESIA PREPROCEDURE EVALUATION
Anesthesia Pre-Procedure Evaluation    Patient: Kirti Ng   MRN: [id number] : 1945          Preoperative Diagnosis: Hip fracture (H) [S72.009A]    Procedure(s):  INTERNAL FIXATION, FRACTURE, TROCHANTERIC, HIP, USING PINS OR RODS    No past medical history on file.  No past surgical history on file.  Anesthesia Evaluation     .             ROS/MED HX    ENT/Pulmonary:     (+)tobacco use, , . .   (-) asthma, sleep apnea and Other pulmonary disease   Neurologic:      (-) seizures, CVA, Dementia, Neuropathy and migraines   Cardiovascular:        (-) hypertension, CAD, CHF, arrhythmias, pulmonary hypertension and dyslipidemia   METS/Exercise Tolerance:     Hematologic:        (-) anemia   Musculoskeletal:   (+) fracture lower extremity, other musculoskeletal- Osteoporosis      GI/Hepatic:        (-) GERD and hepatitis   Renal/Genitourinary:      (-) renal disease   Endo:      (-) Type I DM, Type II DM, thyroid disease, chronic steroid usage, other endocrine disorder and obesity   Psychiatric:        (-) psychiatric history   Infectious Disease:  - neg infectious disease ROS       Malignancy:      - no malignancy   Other:    - neg other ROS                      Physical Exam      Airway   Mallampati: II  TM distance: >3 FB  Neck ROM: full    Dental     Cardiovascular   Rhythm and rate: regular and normal  (-) no murmur    Pulmonary    breath sounds clear to auscultation    Other findings: Lab Test        20                       0632          1319          WBC           --          8.7           HGB          11.4*        12.2          MCV           --          90            PLT           --          320           INR           --          0.98           Lab Test        20                       0632          1319          NA           137          137           POTASSIUM    3.4          3.7           CHLORIDE     109          107           CO2          22           23             BUN          12           22            CR           0.54         0.63          ANIONGAP     6            7             FERDINAND          8.1*         8.8           GLC          122*         111*                Lab Results   Component Value Date    WBC 8.7 02/01/2020    HGB 11.4 (L) 02/02/2020    HCT 37.9 02/01/2020     02/01/2020     02/02/2020    POTASSIUM 3.4 02/02/2020    CHLORIDE 109 02/02/2020    CO2 22 02/02/2020    BUN 12 02/02/2020    CR 0.54 02/02/2020     (H) 02/02/2020    FERDINAND 8.1 (L) 02/02/2020    PTT 28 02/01/2020    INR 0.98 02/01/2020       Preop Vitals  BP Readings from Last 3 Encounters:   02/02/20 (!) 145/62    Pulse Readings from Last 3 Encounters:   02/02/20 80      Resp Readings from Last 3 Encounters:   02/02/20 16    SpO2 Readings from Last 3 Encounters:   02/02/20 96%      Temp Readings from Last 1 Encounters:   02/02/20 98.8  F (37.1  C) (Temporal)    Ht Readings from Last 1 Encounters:   No data found for Ht      Wt Readings from Last 1 Encounters:   No data found for Wt    There is no height or weight on file to calculate BMI.       Anesthesia Plan      History & Physical Review  History and physical reviewed and following examination; no interval change.    ASA Status:  2 .        Plan for General and LMA with Propofol induction. Maintenance will be Balanced.    PONV prophylaxis:  Ondansetron (or other 5HT-3)  Nondisplaced FNF.      Postoperative Care  Postoperative pain management:  IV analgesics and Oral pain medications.      Consents  Anesthetic plan, risks, benefits and alternatives discussed with:  Patient..                 Landen Shipman MD                    .

## 2020-02-02 NOTE — PLAN OF CARE
A&O x4. VSS. LS CTA all fields. BS active x4. CMS intact. IV dilaudid for pain. Mild emesis, zofran given. NPO since midnight. Bedrest. Surgical bath performed. Patient unable to void, benoit catheter placed and patent. Surgery scheduled for 1255 today.    Dr KERR aware of pt arrival.

## 2020-02-02 NOTE — CONSULTS
Consult Date:  2020      CHIEF COMPLAINT:  Left hip injury.      HISTORY OF PRESENT ILLNESS:  The patient is a healthy 74-year-old female who lives alone and actively works.  She does smoke but has no other medical problems.  She was visiting her family in the [place] area and slipped and fell onto her hip, sustaining an isolated left hip injury.  She has no other complaints or problems.  She is accompanied by her son.      PAST MEDICAL AND SURGICAL HISTORY:  Negative apart from as noted above.      MEDICATIONS:  None.      ALLERGIES:  SEE CHART.        PHYSICAL EXAMINATION:  She is nontender about her bilateral upper extremities and right lower extremity.  She has pain with rotation of her left hip.  She has no tenderness about the knee or ankle.  Skin perfusion is intact.  Respirations are normal.  Her abdomen is soft.  Heart rate is regular.  She is conversant.  She is able to wiggle her toes.      RADIOGRAPHIC EVALUATION:  X-rays demonstrate a valgus impacted femoral neck fracture with minimal angulation on the lateral view.      IMPRESSION:     1.  Valgus impacted left femoral neck fractures.   2.  Tobacco habituation.      RECOMMENDATIONS:  I reviewed the pros and cons associated with CRUZITO versus internal fixation.  Given her overall health, acceptable bone quality and minimal displacement, I feel that ORIF is her best option.  I reviewed that the literature would suggest a 90% healing rate in this situation but due to subtle osteopenia, I would suspect that this is in the range of 80%.  I reviewed the risk of arthritis, AVN collapse and hardware failure, necessitating a total hip arthroplasty.  This was reviewed in detail patient and her son.  All questions were answered.         PAT REYNA MD             D: 2020   T: 2020   MT:       Name:     LAMONT FATIMA   MRN:      [id number]        Account:       [id number]   :      1945           Consult Date:  2020       Document: [id number]

## 2020-02-02 NOTE — PLAN OF CARE
Pt A&O x4. VS stable; afebrile. IV dilaudid and PO oxycodone managing pain. CMS intact. Bedrest; repositioned as pt would allow. Loera patent and draining. NPO since midnight. IV zofran given for nausea; no emesis.     Shaun wipes done.    Pt went down to surgery @ 1115.

## 2020-02-02 NOTE — PROGRESS NOTES
[facility]  Hospitalist Progress Note  Rk Monroe MD 02/02/2020    Reason for Stay (Diagnosis): left hip fracture         Assessment and Plan:      Summary of Stay: Kirti Ng is a 74 year old female admitted on 2/1/2020 with left hip fracture after a fall.      Problem List:   1. POD #0 ORIF left femoral neck fracture.    PLAN:  1.  Routine post-op cares per Ortho.     DVT Prophylaxis: ASA as per Ortho  Code Status: Full Code  Discharge Dispo: TBD  Estimated Disch Date / # of Days until Disch: TBD, pending recovery of function        Interval History (Subjective):      Felt well in the am prior to procedure.     Pt was again seen after the procedure, appeared comfortable and alert.                   Physical Exam:      Last Vital Signs:  /51   Pulse 91   Temp 98.2  F (36.8  C) (Temporal)   Resp 16   SpO2 95%     I/O last 3 completed shifts:  In: 1783 [I.V.:1783]  Out: 2025 [Urine:1775; Emesis/NG output:200; Blood:50]    Constitutional: Awake, alert, cooperative, no apparent distress   Respiratory: Clear to auscultation bilaterally, no crackles or wheezing   Cardiovascular: Regular rate and rhythm, normal S1 and S2, and no murmur noted   Abdomen: Normal bowel sounds, soft, non-distended, non-tender   Skin: No rashes, no cyanosis, dry to touch   Neuro: Alert and oriented x3.  Very sleepy post-op.2   Extremities: No edema, bilat DP pulses palpable.   Other(s):        All other systems: Negative          Medications:      All current medications were reviewed with changes reflected in problem list.         Data:      All new lab and imaging data was reviewed.   Labs/Imaging:  Results for orders placed or performed during the hospital encounter of 02/01/20 (from the past 24 hour(s))   Basic metabolic panel   Result Value Ref Range    Sodium 137 133 - 144 mmol/L    Potassium 3.4 3.4 - 5.3 mmol/L    Chloride 109 94 - 109 mmol/L    Carbon Dioxide 22 20 - 32 mmol/L    Anion Gap 6 3 - 14  mmol/L    Glucose 122 (H) 70 - 99 mg/dL    Urea Nitrogen 12 7 - 30 mg/dL    Creatinine 0.54 0.52 - 1.04 mg/dL    GFR Estimate >90 >60 mL/min/[1.73_m2]    GFR Estimate If Black >90 >60 mL/min/[1.73_m2]    Calcium 8.1 (L) 8.5 - 10.1 mg/dL   Hemoglobin   Result Value Ref Range    Hemoglobin 11.4 (L) 11.7 - 15.7 g/dL   XR Surgery INDY L/T 5 Min Fluoro w Stills    Narrative    This exam was marked as non-reportable because it will not be read by a   radiologist or a [place] non-radiologist provider.

## 2020-02-02 NOTE — PROGRESS NOTES
SPIRITUAL HEALTH SERVICES Progress Note  [facility] Orth[facility]ine    Visited pt pre-surgery in her room per page from Ortho Spine floor. Pt Kirti was anxious. Her son was with her. Invited brief conversation about her injury and her hopes from surgery (less pain). She asked for prayer which was provided.      Rin Edmond   Intern

## 2020-02-03 ENCOUNTER — APPOINTMENT (OUTPATIENT)
Dept: PHYSICAL THERAPY | Facility: CLINIC | Age: 75
DRG: 482 | End: 2020-02-03
Attending: ORTHOPAEDIC SURGERY
Payer: COMMERCIAL

## 2020-02-03 ENCOUNTER — APPOINTMENT (OUTPATIENT)
Dept: OCCUPATIONAL THERAPY | Facility: CLINIC | Age: 75
DRG: 482 | End: 2020-02-03
Attending: ORTHOPAEDIC SURGERY
Payer: COMMERCIAL

## 2020-02-03 LAB
ANION GAP SERPL CALCULATED.3IONS-SCNC: 6 MMOL/L (ref 3–14)
BUN SERPL-MCNC: 7 MG/DL (ref 7–30)
CALCIUM SERPL-MCNC: 8.2 MG/DL (ref 8.5–10.1)
CHLORIDE SERPL-SCNC: 106 MMOL/L (ref 94–109)
CO2 SERPL-SCNC: 23 MMOL/L (ref 20–32)
CREAT SERPL-MCNC: 0.55 MG/DL (ref 0.52–1.04)
GFR SERPL CREATININE-BSD FRML MDRD: >90 ML/MIN/{1.73_M2}
GLUCOSE SERPL-MCNC: 98 MG/DL (ref 70–99)
HGB BLD-MCNC: 10.6 G/DL (ref 11.7–15.7)
MAGNESIUM SERPL-MCNC: 1.7 MG/DL (ref 1.6–2.3)
POTASSIUM SERPL-SCNC: 3.3 MMOL/L (ref 3.4–5.3)
POTASSIUM SERPL-SCNC: 3.8 MMOL/L (ref 3.4–5.3)
SODIUM SERPL-SCNC: 135 MMOL/L (ref 133–144)

## 2020-02-03 PROCEDURE — 97116 GAIT TRAINING THERAPY: CPT | Mod: GP

## 2020-02-03 PROCEDURE — 25000132 ZZH RX MED GY IP 250 OP 250 PS 637: Performed by: ORTHOPAEDIC SURGERY

## 2020-02-03 PROCEDURE — 85018 HEMOGLOBIN: CPT | Performed by: ORTHOPAEDIC SURGERY

## 2020-02-03 PROCEDURE — 97165 OT EVAL LOW COMPLEX 30 MIN: CPT | Mod: GO | Performed by: STUDENT IN AN ORGANIZED HEALTH CARE EDUCATION/TRAINING PROGRAM

## 2020-02-03 PROCEDURE — 97161 PT EVAL LOW COMPLEX 20 MIN: CPT | Mod: GP

## 2020-02-03 PROCEDURE — 25000132 ZZH RX MED GY IP 250 OP 250 PS 637: Performed by: INTERNAL MEDICINE

## 2020-02-03 PROCEDURE — 99207 ZZC CDG-DOWN CODE MED NECESSITY: CPT | Performed by: INTERNAL MEDICINE

## 2020-02-03 PROCEDURE — 97535 SELF CARE MNGMENT TRAINING: CPT | Mod: GO | Performed by: STUDENT IN AN ORGANIZED HEALTH CARE EDUCATION/TRAINING PROGRAM

## 2020-02-03 PROCEDURE — 25800030 ZZH RX IP 258 OP 636: Performed by: ORTHOPAEDIC SURGERY

## 2020-02-03 PROCEDURE — 36415 COLL VENOUS BLD VENIPUNCTURE: CPT | Performed by: ORTHOPAEDIC SURGERY

## 2020-02-03 PROCEDURE — 12000000 ZZH R&B MED SURG/OB

## 2020-02-03 PROCEDURE — 84132 ASSAY OF SERUM POTASSIUM: CPT | Performed by: ORTHOPAEDIC SURGERY

## 2020-02-03 PROCEDURE — 97530 THERAPEUTIC ACTIVITIES: CPT | Mod: GP

## 2020-02-03 PROCEDURE — 80048 BASIC METABOLIC PNL TOTAL CA: CPT | Performed by: ORTHOPAEDIC SURGERY

## 2020-02-03 PROCEDURE — 25000128 H RX IP 250 OP 636: Performed by: ORTHOPAEDIC SURGERY

## 2020-02-03 PROCEDURE — 83735 ASSAY OF MAGNESIUM: CPT | Performed by: ORTHOPAEDIC SURGERY

## 2020-02-03 RX ORDER — MAGNESIUM SULFATE HEPTAHYDRATE 40 MG/ML
4 INJECTION, SOLUTION INTRAVENOUS EVERY 4 HOURS PRN
Status: DISCONTINUED | OUTPATIENT
Start: 2020-02-03 | End: 2020-02-04 | Stop reason: HOSPADM

## 2020-02-03 RX ORDER — POTASSIUM CHLORIDE 7.45 MG/ML
10 INJECTION INTRAVENOUS
Status: DISCONTINUED | OUTPATIENT
Start: 2020-02-03 | End: 2020-02-04 | Stop reason: HOSPADM

## 2020-02-03 RX ORDER — POTASSIUM CHLORIDE 29.8 MG/ML
20 INJECTION INTRAVENOUS
Status: DISCONTINUED | OUTPATIENT
Start: 2020-02-03 | End: 2020-02-03

## 2020-02-03 RX ORDER — POTASSIUM CL/LIDO/0.9 % NACL 10MEQ/0.1L
10 INTRAVENOUS SOLUTION, PIGGYBACK (ML) INTRAVENOUS
Status: DISCONTINUED | OUTPATIENT
Start: 2020-02-03 | End: 2020-02-04 | Stop reason: HOSPADM

## 2020-02-03 RX ORDER — POTASSIUM CHLORIDE 1500 MG/1
20-40 TABLET, EXTENDED RELEASE ORAL
Status: DISCONTINUED | OUTPATIENT
Start: 2020-02-03 | End: 2020-02-04 | Stop reason: HOSPADM

## 2020-02-03 RX ORDER — POTASSIUM CHLORIDE 1.5 G/1.58G
20-40 POWDER, FOR SOLUTION ORAL
Status: DISCONTINUED | OUTPATIENT
Start: 2020-02-03 | End: 2020-02-04 | Stop reason: HOSPADM

## 2020-02-03 RX ADMIN — OXYCODONE HYDROCHLORIDE 5 MG: 5 TABLET ORAL at 00:03

## 2020-02-03 RX ADMIN — SENNOSIDES AND DOCUSATE SODIUM 2 TABLET: 8.6; 5 TABLET ORAL at 19:42

## 2020-02-03 RX ADMIN — ACETAMINOPHEN 975 MG: 325 TABLET, FILM COATED ORAL at 19:41

## 2020-02-03 RX ADMIN — OXYCODONE HYDROCHLORIDE 5 MG: 5 TABLET ORAL at 04:03

## 2020-02-03 RX ADMIN — ASPIRIN 325 MG: 325 TABLET, DELAYED RELEASE ORAL at 08:28

## 2020-02-03 RX ADMIN — CEFAZOLIN SODIUM 1 G: 1 INJECTION, SOLUTION INTRAVENOUS at 03:55

## 2020-02-03 RX ADMIN — ACETAMINOPHEN 975 MG: 325 TABLET, FILM COATED ORAL at 03:55

## 2020-02-03 RX ADMIN — SODIUM CHLORIDE, POTASSIUM CHLORIDE, SODIUM LACTATE AND CALCIUM CHLORIDE: 600; 310; 30; 20 INJECTION, SOLUTION INTRAVENOUS at 01:05

## 2020-02-03 RX ADMIN — OXYCODONE HYDROCHLORIDE 5 MG: 5 TABLET ORAL at 09:13

## 2020-02-03 RX ADMIN — POTASSIUM CHLORIDE 20 MEQ: 1500 TABLET, EXTENDED RELEASE ORAL at 17:25

## 2020-02-03 RX ADMIN — ACETAMINOPHEN 975 MG: 325 TABLET, FILM COATED ORAL at 12:43

## 2020-02-03 RX ADMIN — POTASSIUM CHLORIDE 40 MEQ: 1500 TABLET, EXTENDED RELEASE ORAL at 14:53

## 2020-02-03 RX ADMIN — OXYCODONE HYDROCHLORIDE 5 MG: 5 TABLET ORAL at 19:42

## 2020-02-03 RX ADMIN — SENNOSIDES AND DOCUSATE SODIUM 2 TABLET: 8.6; 5 TABLET ORAL at 08:28

## 2020-02-03 ASSESSMENT — ACTIVITIES OF DAILY LIVING (ADL)
ADLS_ACUITY_SCORE: 15
ADLS_ACUITY_SCORE: 14
ADLS_ACUITY_SCORE: 15
ADLS_ACUITY_SCORE: 15
PREVIOUS_RESPONSIBILITIES: MEAL PREP;HOUSEKEEPING;LAUNDRY;SHOPPING;MEDICATION MANAGEMENT;FINANCES;DRIVING;WORK

## 2020-02-03 NOTE — PLAN OF CARE
Discharge Planner PT   Patient plan for discharge: discharge to son's house, per son family will be present almost always at discharge   Current status:     Eval complete, treatment indicated. Edu PT Role and POC, PWB to LLE. Son present and supportive. Visual demo proper STS technique. STS x 4 with FWW progerssed to SBA no LOB. Pt educated on ice to L hip after activity and periodically throughout the day. Pt educated on performing AP and seated LAQ within tolerance for improved bld flow.     Focus on gait training. PWB LLE. Walker adjusted to appropriate ht. Will issue walker for home. Visual demo of proper gait pattern. Pt educated on unweighting with BUE with stance phase of gait on LLE. Pt required cues to decrease step length to improve adherence to WB precautions. No LOB. stair training with use of platform step, visual demo x 2 with FWW, pt performed well with CGA. Son present for family traning. no LOBl     End of session pt left seated with needs in reach and ice to L hip   Barriers to return to prior living situation: None   Recommendations for discharge: home with assist for stair navigation  Rationale for recommendations: Predict pt will progress to mod I with transfers and gait with FWW household distances. Recommend CGA for navigating Platform step into son's house             Entered by: Laila Diaz 02/03/2020 10:55 AM

## 2020-02-03 NOTE — PROGRESS NOTES
Orthopedic Surgery  Kirti Ng  2/3/2020  Admit Date:  2020  POD: 1 Day Post-Op   Procedure(s):  Open reduction with internal fixation, left femoral neck fracture    Alert and orient to person, place, and time.  Patient resting comfortably in bed.    Pain controlled while at rest  Tolerating oral intake.    Denies nausea or vomiting  Denies chest pain or shortness of breath    Vital Sign Ranges  Temperature Temp  Av.9  F (36.6  C)  Min: 96.2  F (35.7  C)  Max: 99.3  F (37.4  C)   Blood pressure Systolic (24hrs), Av , Min:108 , Max:166        Diastolic (24hrs), Av, Min:46, Max:83      Pulse Pulse  Av  Min: 78  Max: 91   Respirations Resp  Av.9  Min: 9  Max: 17   Pulse oximetry SpO2  Av.3 %  Min: 94 %  Max: 100 %       Dressing is clean, dry, and intact.   Minimal erythema of the surrounding skin.   Bilateral calves are soft, non-tender.  Left lower extremity is NVI.  Sensation intact bilateral lower extremities  Patient able to resist dorsi and plantar flexion bilaterally  +Dp pulse    Labs:  Recent Labs   Lab Test 20  0617 20  0632 20  1319   POTASSIUM 3.3* 3.4 3.7     Recent Labs   Lab Test 20  0617 20  0632 20  1319   HGB 10.6* 11.4* 12.2     Recent Labs   Lab Test 20  1319   INR 0.98     Recent Labs   Lab Test 20  1319          1. PLAN:   Continue ASA for DVT prophylaxis.     Mobilize with PT/OT    WBAT Left LE with walker.     Continue current pain regiment.   Dressings: Keep intact.  Change if >60% saturated   Follow-up: 2 weeks Dr Alxe team    2. Disposition   Anticipate d/c to home with home PT 1-2 days when medically cleared and progressing in PT.    Darby Erazo PA-C

## 2020-02-03 NOTE — PROGRESS NOTES
[facility]  Hospitalist Progress Note  Rk Monroe MD 02/03/2020    Reason for Stay (Diagnosis): left hip fracture         Assessment and Plan:      Summary of Stay: Kirti Ng is a 74 year old female admitted on 2/1/2020 with left hip fracture after a fall.      Problem List:   1. POD #1 ORIF left femoral neck fracture.    PLAN:  1.  Routine post-op cares per Ortho.     DVT Prophylaxis: ASA as per Ortho  Code Status: Full Code  Discharge Dispo: Return to son's home with physical therapy expected  Estimated Disch Date / # of Days until Disch: Possibly tomorrow.        Interval History (Subjective):      Generally feeling very well and positively about the result.  She is already been up ambulating several times.  She describes pain but it is not intolerable.                  Physical Exam:      Last Vital Signs:  BP (!) 148/76   Pulse 78   Temp 99.3  F (37.4  C) (Temporal)   Resp 16   SpO2 98%     I/O last 3 completed shifts:  In: 3349 [P.O.:1025; I.V.:2324]  Out: 1950 [Urine:1900; Blood:50]    Constitutional: Awake, alert, cooperative, no apparent distress   Respiratory: Clear to auscultation bilaterally, no crackles or wheezing   Cardiovascular: Regular rate and rhythm, normal S1 and S2, and no murmur noted   Abdomen: Normal bowel sounds, soft, non-distended, non-tender   Skin: No rashes.   Neuro: Alert and oriented x3.     Extremities: No edema, bilat DP pulses palpable.   Other(s):        All other systems: Negative          Medications:      All current medications were reviewed with changes reflected in problem list.         Data:      All new lab and imaging data was reviewed.   Labs/Imaging:  Results for orders placed or performed during the hospital encounter of 02/01/20 (from the past 24 hour(s))   XR Surgery INDY L/T 5 Min Fluoro w Stills    Narrative    This exam was marked as non-reportable because it will not be read by a   radiologist or a [place] non-radiologist  provider.             Hemoglobin   Result Value Ref Range    Hemoglobin 10.6 (L) 11.7 - 15.7 g/dL   Basic metabolic panel   Result Value Ref Range    Sodium 135 133 - 144 mmol/L    Potassium 3.3 (L) 3.4 - 5.3 mmol/L    Chloride 106 94 - 109 mmol/L    Carbon Dioxide 23 20 - 32 mmol/L    Anion Gap 6 3 - 14 mmol/L    Glucose 98 70 - 99 mg/dL    Urea Nitrogen 7 7 - 30 mg/dL    Creatinine 0.55 0.52 - 1.04 mg/dL    GFR Estimate >90 >60 mL/min/[1.73_m2]    GFR Estimate If Black >90 >60 mL/min/[1.73_m2]    Calcium 8.2 (L) 8.5 - 10.1 mg/dL

## 2020-02-03 NOTE — PROGRESS NOTES
02/03/20 1351   Quick Adds   Type of Visit Initial Occupational Therapy Evaluation   Living Environment   Lives With alone   Living Arrangements house   Transportation Anticipated car, drives self   Living Environment Comment pt lives alone, [place]'s home, possibly 2 weeks   Self-Care   Usual Activity Tolerance good   Current Activity Tolerance moderate   Equipment Currently Used at Home none   Activity/Exercise/Self-Care Comment pt works 2 days a week as hairdresser   Functional Level   Ambulation 0-->independent   Transferring 0-->independent   Toileting 1-->assistive equipment  (tall toilet)   Bathing 0-->independent  (step in shower)   Dressing 0-->independent   Fall history within last six months yes   Number of times patient has fallen within last six months 1   Prior Functional Level Comment independent at baseline, pt has tall toilet and step in shower at home, at her son's home has standard height toilet and tub shower   General Information   Onset of Illness/Injury or Date of Surgery - Date 02/02/20   Referring Physician Isai BARROS   Patient/Family Goals Statement return to son's home prior to return home   Additional Occupational Profile Info/Pertinent History of Current Problem POD#1 L hip ORIF after falling and resulting in fx; pt has no significant PMH however son reported he has noticed pt being forgetful   Precautions/Limitations fall precautions   Weight-Bearing Status - LLE weight-bearing as tolerated   Cognitive Status Examination   Orientation orientation to person, place and time   Level of Consciousness alert   Memory impaired   Cognitive Comment pt demonstrated deficits with multiple step instructions and abstract thinking   Visual Perception   Visual Perception Wears glasses   Pain Assessment   Patient Currently in Pain Yes, see Vital Sign flowsheet  (with activity, pt reported to be tolerable pain)   Range of Motion (ROM)   ROM Comment B UE WFL   Strength  "  Strength Comments B UE grossly 4/5   Mobility   Bed Mobility Comments SBA   Transfer Skills   Transfer Comments FWW, CGA-SBA   Balance   Balance Comments imapired post-op, defer further to PT   Lower Body Dressing   Level of [place]: Dress Lower Body minimum assist (75% patients effort)   Physical Assist/Nonphysical Assist: Dress Lower Body verbal cues   Toileting   Level of [place]: Toilet contact guard   Physical Assist/Nonphysical Assist: Toilet verbal cues   Grooming   Level of [place]: Grooming stand-by assist   Physical Assist/Nonphysical Assist: Grooming verbal cues   Instrumental Activities of Daily Living (IADL)   Previous Responsibilities meal prep;housekeeping;laundry;shopping;medication management;finances;driving;work   Activities of Daily Living Analysis   Impairments Contributing to Impaired Activities of Daily Living balance impaired;cognition impaired;pain;strength decreased   General Therapy Interventions   Planned Therapy Interventions ADL retraining;IADL retraining;transfer training   Clinical Impression   Criteria for Skilled Therapeutic Interventions Met yes, treatment indicated   OT Diagnosis impaired ability to manage ADL/IADLs   Influenced by the following impairments post-op pain, fatigue, impaired balance and activity tolerance   Assessment of Occupational Performance 3-5 Performance Deficits   Identified Performance Deficits dressing, bathing, toileting, cooking, driving   Clinical Decision Making (Complexity) Low complexity   Therapy Frequency Daily   Predicted Duration of Therapy Intervention (days/wks) 3 days   Anticipated Discharge Disposition Home   Risks and Benefits of Treatment have been explained. Yes   Patient, Family & other staff in agreement with plan of care Yes   Clinical Impression Comments demonstrates ability to benefit from skilled OT services   [facility] AM-PAC TM \"6 Clicks\"   2016, Trustees of [facility], under license to [employer].  " "All rights reserved.   6 Clicks Short Forms Daily Activity Inpatient Short Form   [facility] AM-PAC  \"6 Clicks\" Daily Activity Inpatient Short Form   1. Putting on and taking off regular lower body clothing? 3 - A Little   2. Bathing (including washing, rinsing, drying)? 3 - A Little   3. Toileting, which includes using toilet, bedpan or urinal? 3 - A Little   4. Putting on and taking off regular upper body clothing? 4 - None   5. Taking care of personal grooming such as brushing teeth? 3 - A Little   6. Eating meals? 4 - None   Daily Activity Raw Score (Score out of 24.Lower scores equate to lower levels of function) 20   Total Evaluation Time   Total Evaluation Time (Minutes) 7     "

## 2020-02-03 NOTE — PROGRESS NOTES
SWS     D: Per request to see regarding discharge planning, noted surgery yesterday, and noted PT assessment with anticipation of pt's discharge to son's home. SW will meet with pt tomorrow for assessment and to initiate discharge planning.         .    IVETT Lund   [facility]     [phone]

## 2020-02-03 NOTE — PLAN OF CARE
Alert and oriented x 4.  Vital signs stable.  Lungs clear, encouraged inspirometer use.  Bowel sounds hypoactive, tolerated clear liquids, no nausea.  Dressing intact to left hip, abductor pillow in place.ice pack applied.  CMS intact, pcds; on, encouraged ankle pump exercises.  Sat up and stood at bedside with walker, gait belt and assist of 2.  Pain controlled with tylenol, refused narcotic pain med.  Care plan and goals reviewed with pt and son.

## 2020-02-03 NOTE — PLAN OF CARE
OT: Evaluation completed, treatment initiated POD#1 L hip ORIF after falling and resulting in fx; pt has no significant PMH however son reported he has noticed pt being forgetful    Pt independent at baseline, lives up [place] and was visiting son and his family for a birthday party.     Discharge Planner OT   Patient plan for discharge: home to her son's house prior to return home  Current status: Prior to activity pt educated on WBAT status as previously was instructed on partial WB through L LE; pt noted to be impulsive required cues to wait for safety precautions including gait belt and socks; pt able to don R sock, unable to manage L, requested assist; pt completed comfort height toilet, simulated to pt's home set-up, FWW, CGA; pt independent with brenda cares and clothing management; pt completed standing hygiene tasks at sink, SBA; pt returned to bed as reported fatigue from restless night; once sitting in flat bed pt educated on techniques to manage lower body dressing tasks, pt able to doff/don L sock, SBA following cues  Barriers to return to prior living situation: barriers for pt's home: lives alone, a few hrs up [place]  Recommendations for discharge: to son's home with assist for IADLs, possible home OT  Rationale for recommendations: pt is below baseline for management of ADLs, IADLs, mobility and transfers following surgery. Will continue to follow for IP OT services, may benefit from home OT services, family reports history of forgetfulness, may benefit from cognitive assessment in future       Entered by: Rin Guerin 02/03/2020 2:43 PM

## 2020-02-03 NOTE — OP NOTE
Procedure Date: 2020      PREOPERATIVE DIAGNOSIS:  Valgus impacted left femoral neck fracture.      POSTOPERATIVE DIAGNOSIS:  Valgus impacted left femoral neck fracture.      PROCEDURE:  Open reduction with internal fixation, left femoral neck fracture.      SURGEON:  Pat Reyna MD      ASSISTANT:  Lluvia Zuniga PA-C      ANESTHESIA:  General with local.      ESTIMATED BLOOD LOSS:  25 mL.      COMPLICATIONS:  None.      DESCRIPTION OF PROCEDURE:  The patient was taken to the operating room, where, after administration of general anesthetic, antibiotic prophylaxis and sterile prep and drape, a lateral incision was made after administration of local anesthetic.  The fascia was split and a  hole drilled, followed by overdrilling and placement of a Synthes 135 degree 85 mm length screw and side plate.  A superior derotation screw was placed in excellent position, confirmed by fluoroscopy.  The fracture was nondisplaced on the lateral with minimal apex anterior angulation and had slight valgus impaction.  After copious lavage, layered anatomic closure was accomplished, followed by sterile bandage.  Intraoperative AP and lateral views showed essentially anatomic fracture alignment with excellent hardware position and no complications.         PAT REYNA MD             D: 2020   T: 2020   MT: [id number]      Name:     LAMONT FATIMA   MRN:      [id number]        Account:        [id number]   :      1945           Procedure Date: 2020      Document: [id number]

## 2020-02-03 NOTE — PROGRESS NOTES
02/03/20 1042   Quick Adds   Type of Visit Initial PT Evaluation   Living Environment   Lives With alone   Living Arrangements house   Living Environment Comment Pt will be staying at her sons house. Tub shower, needs to negotiate 2 steps with landing    Self-Care   Usual Activity Tolerance good   Current Activity Tolerance moderate   Activity/Exercise/Self-Care Comment IND   Functional Level Prior   Ambulation 0-->independent   Transferring 0-->independent   Toileting 0-->independent   Bathing 0-->independent   Communication 0-->understands/communicates without difficulty   Swallowing 0-->swallows foods/liquids without difficulty   Fall history within last six months yes   Number of times patient has fallen within last six months 1   Prior Functional Level Comment IND fell when walking between cars in garage, narrow space    General Information   Onset of Illness/Injury or Date of Surgery - Date 02/01/20   Referring Physician Karlos Alex MD   Pertinent History of Current Problem (include personal factors and/or comorbidities that impact the POC) Kirti Ng is a 74 year old female admitted on 2/1/2020 with left hip fracture after a fall.   Weight-Bearing Status - LLE partial weight-bearing (% in comments)   General Observations Son presenta nd supportive. Son is a , niece is a PT    Cognitive Status Examination   Orientation person;place   Memory impaired   Cognitive Comment Pt is forgetful    Pain Assessment   Patient Currently in Pain No   Posture    Posture Forward head position   Range of Motion (ROM)   ROM Comment BLE WFL    Strength   Strength Comments BLE > 3/5 strength based on functional mobility    Bed Mobility   Bed Mobility Comments IND   Transfer Skills   Transfer Comments STS with FWW and CGA   Gait   Gait Comments Pt ambulates with FWW and CGA    Balance   Balance Comments Good dynamic balance with use of FWW    Sensory Examination   Sensory Perception Comments intact  "to light touch    General Therapy Interventions   Planned Therapy Interventions balance training;gait training;stretching;transfer training   Clinical Impression   Criteria for Skilled Therapeutic Intervention yes, treatment indicated   PT Diagnosis impaired IND with functional transfers and gait from baseline   Influenced by the following impairments PWB LLE, impaired high level balance, impaired functional strength    Functional limitations due to impairments see above   Clinical Presentation Stable/Uncomplicated   Clinical Presentation Rationale clinical judgement   Clinical Decision Making (Complexity) Low complexity   Therapy Frequency Daily   Predicted Duration of Therapy Intervention (days/wks) 3 days   Anticipated Discharge Disposition Home with Assist   Risk & Benefits of therapy have been explained Yes   Patient, Family & other staff in agreement with plan of care Yes   [facility] AM-PAC  \"6 Clicks\" V.2 Basic Mobility Inpatient Short Form   1. Turning from your back to your side while in a flat bed without using bedrails? 4 - None   2. Moving from lying on your back to sitting on the side of a flat bed without using bedrails? 4 - None   3. Moving to and from a bed to a chair (including a wheelchair)? 4 - None   4. Standing up from a chair using your arms (e.g., wheelchair, or bedside chair)? 3 - A Little   5. To walk in hospital room? 3 - A Little   6. Climbing 3-5 steps with a railing? 3 - A Little   Basic Mobility Raw Score (Score out of 24.Lower scores equate to lower levels of function) 21   Total Evaluation Time   Total Evaluation Time (Minutes) 15     "

## 2020-02-03 NOTE — PLAN OF CARE
Pt A&O x4. VS stable; afebrile. K 3.3-replacement started. PO oxycodone and scheduled tylenol managing pain. CMS intact. Dressing CDI-incision iced. Up w/ A1, using gait belt, walker. Voiding in good amts. Tolerating regular diet. Plan is home @ discharge. Will continue to monitor.

## 2020-02-03 NOTE — PLAN OF CARE
Pt A/O x4, afebrile. 2L O2. CMS intact, dressing CDI. Pain managed with Oxycodone. Advanced to regular diet this am. Loera removed- DTV. Assist of 2 with walker and gait belt. Saline locked, denies nausea. Will continue to monitor.

## 2020-02-04 ENCOUNTER — APPOINTMENT (OUTPATIENT)
Dept: PHYSICAL THERAPY | Facility: CLINIC | Age: 75
DRG: 482 | End: 2020-02-04
Payer: COMMERCIAL

## 2020-02-04 ENCOUNTER — APPOINTMENT (OUTPATIENT)
Dept: OCCUPATIONAL THERAPY | Facility: CLINIC | Age: 75
DRG: 482 | End: 2020-02-04
Payer: COMMERCIAL

## 2020-02-04 VITALS
RESPIRATION RATE: 16 BRPM | WEIGHT: 105 LBS | DIASTOLIC BLOOD PRESSURE: 70 MMHG | TEMPERATURE: 100.4 F | OXYGEN SATURATION: 98 % | SYSTOLIC BLOOD PRESSURE: 142 MMHG | HEART RATE: 80 BPM

## 2020-02-04 LAB
GLUCOSE SERPL-MCNC: 100 MG/DL (ref 70–99)
HGB BLD-MCNC: 10.2 G/DL (ref 11.7–15.7)

## 2020-02-04 PROCEDURE — 97530 THERAPEUTIC ACTIVITIES: CPT | Mod: GP | Performed by: PHYSICAL THERAPY ASSISTANT

## 2020-02-04 PROCEDURE — 36415 COLL VENOUS BLD VENIPUNCTURE: CPT | Performed by: ORTHOPAEDIC SURGERY

## 2020-02-04 PROCEDURE — 25000132 ZZH RX MED GY IP 250 OP 250 PS 637: Performed by: ORTHOPAEDIC SURGERY

## 2020-02-04 PROCEDURE — 82947 ASSAY GLUCOSE BLOOD QUANT: CPT | Performed by: ORTHOPAEDIC SURGERY

## 2020-02-04 PROCEDURE — 99207 ZZC CDG-CODE CATEGORY CHANGED: CPT | Performed by: INTERNAL MEDICINE

## 2020-02-04 PROCEDURE — 99231 SBSQ HOSP IP/OBS SF/LOW 25: CPT | Performed by: INTERNAL MEDICINE

## 2020-02-04 PROCEDURE — 97116 GAIT TRAINING THERAPY: CPT | Mod: GP | Performed by: PHYSICAL THERAPY ASSISTANT

## 2020-02-04 PROCEDURE — 97535 SELF CARE MNGMENT TRAINING: CPT | Mod: GO

## 2020-02-04 PROCEDURE — 85018 HEMOGLOBIN: CPT | Performed by: ORTHOPAEDIC SURGERY

## 2020-02-04 RX ORDER — ASPIRIN 325 MG
325 TABLET, DELAYED RELEASE (ENTERIC COATED) ORAL DAILY
Qty: 30 TABLET | Refills: 0 | Status: SHIPPED | OUTPATIENT
Start: 2020-02-05

## 2020-02-04 RX ORDER — ACETAMINOPHEN 325 MG/1
650 TABLET ORAL EVERY 6 HOURS PRN
Qty: 30 TABLET | Refills: 0 | Status: SHIPPED | OUTPATIENT
Start: 2020-02-04

## 2020-02-04 RX ORDER — OXYCODONE HYDROCHLORIDE 5 MG/1
5-10 TABLET ORAL EVERY 4 HOURS PRN
Qty: 25 TABLET | Refills: 0 | Status: SHIPPED | OUTPATIENT
Start: 2020-02-04

## 2020-02-04 RX ORDER — AMOXICILLIN 250 MG
1 CAPSULE ORAL 2 TIMES DAILY
Qty: 20 TABLET | Refills: 0 | Status: SHIPPED | OUTPATIENT
Start: 2020-02-04

## 2020-02-04 RX ORDER — ONDANSETRON 4 MG/1
4 TABLET, ORALLY DISINTEGRATING ORAL EVERY 6 HOURS PRN
Qty: 6 TABLET | Refills: 0 | Status: SHIPPED | OUTPATIENT
Start: 2020-02-04

## 2020-02-04 RX ADMIN — ACETAMINOPHEN 975 MG: 325 TABLET, FILM COATED ORAL at 11:32

## 2020-02-04 RX ADMIN — ACETAMINOPHEN 975 MG: 325 TABLET, FILM COATED ORAL at 04:21

## 2020-02-04 RX ADMIN — SENNOSIDES AND DOCUSATE SODIUM 1 TABLET: 8.6; 5 TABLET ORAL at 08:05

## 2020-02-04 RX ADMIN — OXYCODONE HYDROCHLORIDE 5 MG: 5 TABLET ORAL at 00:11

## 2020-02-04 RX ADMIN — ASPIRIN 325 MG: 325 TABLET, DELAYED RELEASE ORAL at 08:05

## 2020-02-04 ASSESSMENT — ACTIVITIES OF DAILY LIVING (ADL)
ADLS_ACUITY_SCORE: 15

## 2020-02-04 NOTE — PLAN OF CARE
Pt sleeping comfortably throughout the night. Afebrile. CMS intact. Dressing CDI. Voiding and drinking adequate amounts. Pain managed with Tylenol and Oxycodone. Denies nausea. Tolerating regular diet. Up with assist of 1 with gait belt and walker. Plan is to discharge to home today with son. Will continue to monitor.

## 2020-02-04 NOTE — PLAN OF CARE
Discharge Planner PT   Patient plan for discharge: home to sons   Current status: Mod I bed mobility transfers and gait with RW to 200 feet up and down 12 steps 2 rails and reciprocal pattern. Gait with no device to 5 feet declined SEC instruction.  Barriers to return to prior living situation: None   Recommendations for discharge: home with assist for stair navigation per plan established by the PT.  Per notes Md has ordered home OT  Rationale for recommendations: planned discharge by MD/ PA to home will issue RW per family request. Goals are all met. Recommend to PT for discharge.       Entered by: Estrella Hendrix 02/04/2020 9:25 AM

## 2020-02-04 NOTE — PLAN OF CARE
/84 (BP Location: Right arm)   Pulse 80   Temp 97.8  F (36.6  C) (Temporal)   Resp 16   Wt 47.6 kg (105 lb)   SpO2 97%   Orientation:A&Ox4 forgetful at times  Resp: LS Clear, RA  Pain: Managed with Ice to the hip, Tylenol, and Oxycodone 5mg.  CMS: Intact  Dressing: CDI  Activity: A1, walker & gb, WBAT. Up in chair for dinner and walked the bob x1.  Diet: Regular   Voiding: Spontaneously without difficulty  Potassium replaced this shift and recheck at 2100 was 3.8.  Will continue to monitor.

## 2020-02-04 NOTE — PROGRESS NOTES
Orthopedic Surgery  Kirti Ng  2020  Admit Date:  2020  POD: 2 Days Post-Op   Procedure(s):  Open reduction with internal fixation, left femoral neck fracture    Alert and orient to person, place, and time.  Patient walking halls with minimal pain.   Pain controlled.  Tolerating oral intake.    Denies nausea or vomiting  Denies chest pain or shortness of breath    Vital Sign Ranges  Temperature Temp  Av.9  F (36.6  C)  Min: 97.4  F (36.3  C)  Max: 98.8  F (37.1  C)   Blood pressure Systolic (24hrs), Av , Min:108 , Max:154        Diastolic (24hrs), Av, Min:53, Max:84      Pulse Pulse  Av  Min: 80  Max: 80   Respirations Resp  Avg: 15.6  Min: 14  Max: 16   Pulse oximetry SpO2  Av %  Min: 94 %  Max: 97 %       Dressing is clean, dry, and intact.   Minimal erythema of the surrounding skin.   Bilateral calves are soft, non-tender.  Left lower extremity is NVI.  Sensation intact bilateral lower extremities  Patient able to resist dorsi and plantar flexion bilaterally  +Dp pulse    Labs:  Recent Labs   Lab Test 20  2120 20  0617 20  0632   POTASSIUM 3.8 3.3* 3.4     Recent Labs   Lab Test 20  0614 20  0617 20  0632   HGB 10.2* 10.6* 11.4*     Recent Labs   Lab Test 20  1319   INR 0.98     Recent Labs   Lab Test 20  1319          1. PLAN:   Continue ASA for DVT prophylaxis.     Mobilize with PT/OT    WBAT Left LE with walker.     Continue current pain regiment.   Dressings: Keep intact.    Follow-up: 2 weeks Dr Alex team ([place]    2. Disposition   Anticipate d/c to home with home cares. Ok from ortho standpoint.     Darby Erazo PA-C

## 2020-02-04 NOTE — DISCHARGE SUMMARY
[facility] Discharge Summary    Kirti Ng MRN# [id number]   Age: 74 year old YOB: 1945     Date of Admission:  2/1/2020  Date of Discharge::  2/4/2020  Admitting Physician:  Karlos Alex MD  Discharge Physician:  Rk Monroe MD     Home clinic: [place], [place]          Admission Diagnoses:   Closed fracture of neck of left femur, initial encounter (H) [S72.002A]          Discharge Diagnosis:   Active Problems:    Fracture of femoral neck, left, closed (H)    Hip fracture requiring operative repair (H)          Procedures:   Open reduction with internal fixation, left femoral neck fracture.           Discharge Medications:     Discharge Medication List as of 2/4/2020 12:30 PM      START taking these medications    Details   acetaminophen (TYLENOL) 325 MG tablet Take 2 tablets (650 mg) by mouth every 6 hours as needed for pain, Disp-30 tablet, R-0, E-Prescribe      aspirin (ASA) 325 MG EC tablet Take 1 tablet (325 mg) by mouth daily, Disp-30 tablet, R-0, E-Prescribe      ondansetron (ZOFRAN-ODT) 4 MG ODT tab Take 1 tablet (4 mg) by mouth every 6 hours as needed for nausea or vomiting, Disp-6 tablet, R-0, E-Prescribe      oxyCODONE (ROXICODONE) 5 MG tablet Take 1-2 tablets (5-10 mg) by mouth every 4 hours as needed, Disp-25 tablet, R-0, Local Print      senna-docusate (SENOKOT-S/PERICOLACE) 8.6-50 MG tablet Take 1 tablet by mouth 2 times daily, Disp-20 tablet, R-0, E-Prescribe         CONTINUE these medications which have NOT CHANGED    Details   ibuprofen (ADVIL/MOTRIN) 200 MG tablet Take 800 mg by mouth every 8 hours as needed for mild pain or moderate pain, Historical         STOP taking these medications       doxycycline monohydrate (MONODOX) 100 MG capsule Comments:   Reason for Stopping:         guaiFENesin-codeine (ROBITUSSIN AC) 100-10 MG/5ML solution Comments:   Reason for Stopping:                     Consultations:   Consultation during this admission received from  orthopedics          Hospital Course:   Kirti Ng is a fundamentally healthy woman from [place], [place] who is visitngg family and fell in the garage.  She sustained a left femoral [facility] in anticipation of surgical repair.       [facility] is notable for the absence of major medical issues.  She has never undergone anesthetic and does not take any scheduled medications.  She does smoke but has never been told that she has lung disease.  She does tolerate daily exercise and has no hx exercise-induced CP, syncope or limiting HERNÁNDEZ.      Evaluation to this point indicates the patient is quite low risk for general endotracheal anesthetic.  She does smoke but has no known lung disease.  She has no problems with normal range of motion of her neck.  She does not wear dentures.  She has not had eye surgery but reports cataracts.  Examination of her heart, lungs and abdomen are normal without remarkable findings.  EKG shows a normal sinus rhythm with normal axes and intervals.  Creatinine 0.63, hemoglobin 12.2, white count 8.7, INR 0.98.    BP (!) 142/70   Pulse 80   Temp 100.4  F (38  C) (Temporal)   Resp 16   Wt 47.6 kg (105 lb)   SpO2 98%   On the date of discharge, Ms. Ng is alert, coherent and in NAD.   Chest: CTA  COR: RRR Without murmur  Abd: soft, NTND  Neuro: up and ambulating without dramatic ataxia.  Antalgic.          Discharge Instructions and Follow-Up:   Discharge diet: Regular   Discharge activity: Activity as tolerated   Discharge follow-up: With Ortho as planned.            Discharge Disposition:   Discharged to home      Attestation:  I have reviewed today's vital signs, notes, medications, labs and imaging.  Total time: 25 minutes    Rk Monroe MD

## 2020-02-04 NOTE — CONSULTS
Care Transition Initial Assessment - RN        Met with: Patient and daughter-in-law Susan.  DATA   Active Problems:    Fracture of femoral neck, left, closed (H)    Hip fracture requiring operative repair (H)       Cognitive Status: awake, alert and oriented.        Contact information and PCP information verified: Yes  Lives With: alone(staying with son and family )   Living Arrangements: house     Description of Support System: Supportive, Involved   Who is your support system?: Children   Support Assessment: Adequate family and caregiver support   Insurance concerns: No Insurance issues identified  ASSESSMENT  Patient currently receives the following services:  Pt lives alone at baseline in [facility] but is here visiting her son and his family.      Identified issues/concerns regarding health management: Met w/ pt and daughter-in-law at bedside, pt plans to stay with her son and his family for the next few weeks as she recovers. They report that there are 2 steps into the home and then pt will be able to stay on the main level with access to a living room, kitchen and bathroom. Pt will either sleep in a bottom bunk bed or on the couch until she is able to ambulate up/down 13 stairs to the guest bedroom. Pt's son and Susan work during the day but do have friends/neighbors that can check on pt as needed, pt will also plan to keep her phone in her pocket when she is alone in the home. Discussed utilizing home walker, not ambulating stairs without assistance and getting set-up w/ food, water, medications before being left alone during the day as the kitchen is apparently up a step. Also discussed the importance of establishing care w/ PCP once she returns home, pt is receptive and understanding of this plan.     Pt/family was offered but declined the Medicare Compare list for Home Care, with associated star ratings to assist with choice for referrals/discharge planning Yes    Education was given to pt/family  that star ratings are updated/maintained by Medicare and can be reviewed by visiting www.medicare.gov Yes    Pt elects to use [facility], referral sent. Pt does not have PCP, Ortho has ordered home care for discharge. Called [facility] and [facility] to ensure they will manage home care orders after discharge due to no PCP and being from out of town, awaiting call back to confirm this. Also sent page to Ortho requesting orders for home RN/PT/OT.     Son's home address is:  [address] [place] [address] [place] [address]    Home care should call [facility] [phone] to arrange home visits.     PLAN  Patient given options and choices for discharge yes.  Patient/family is agreeable to the plan?  Yes  Patient anticipates discharging to home w/ son and home care.  Resources List: Home Care     Patient anticipates needs for home equipment: No  Transportation/person available to transport on day of discharge is daughter-in-law   Plan/Disposition: Home   Appointments: Will follow w/ Ortho      Care  (CTS) will continue to follow as needed.    Isela Navarro RN BSN   Inpatient Care Coordination  [facility]   Phone [phone]

## 2020-02-04 NOTE — PLAN OF CARE
Discharge Planner OT   Patient plan for discharge: home to her son's house prior to return home  Current status: Pt seated in chair upon OT arrival, daughter in law present and participating throughout session. Pt educated on LB dressing techniques. Pt doff/ donned socks I, donned/ doffed pants with SBA and verbal cueing.Pt performed sit > stand to FWW with SBA. Pt  ambulated to satellite gym with CGA/ FWW. Pt/ daughter in law educated on safe tub/ shower transfers, use of shower chair, and toilet transfer. Pt performed tub/ shower transfer and toilet transfer with min A for verbal cueing on technique. Pt ambulated back to room, performed stand > sit to chair with SBA/ FWW. Pt/ daughter in law educated on home safety modifications/ recommendations in discharge environment, safe car transfers. Care coordinator was in room at end of session.   Barriers to return to prior living situation: barriers for pt's home: [place] alone[place]  Recommendations for discharge: to son's home with assist for IADLs (driving, home maintenance), SBA for showering possible home OT  Rationale for recommendations: pt is below baseline for management of ADLs, IADLs, mobility and transfers following surgery. Family reports history of forgetfulness, may benefit from cognitive assessment in future       Entered by: Kendra Joy 02/04/2020 11:01 AM    Occupational Therapy Discharge Summary    Reason for therapy discharge:    All goals and outcomes met, no further needs identified.    Progress towards therapy goal(s). See goals on Care Plan in [facility] electronic health record for goal details.  Goals met    Therapy recommendation(s):    Could benefit from home OT services

## 2020-02-06 LAB — INTERPRETATION ECG - MUSE: NORMAL

## (undated) DEVICE — LINEN ORTHO ACL PACK 5447

## (undated) DEVICE — NDL SPINAL 18GA 3.5" 405184

## (undated) DEVICE — GOWN LG DISP 9515

## (undated) DEVICE — SU VICRYL 2-0 CT-1 27" UND J259H

## (undated) DEVICE — ESU GROUND PAD ADULT W/CORD E7507

## (undated) DEVICE — GLOVE PROTEXIS BLUE W/NEU-THERA 7.0  2D73EB70

## (undated) DEVICE — GLOVE PROTEXIS POWDER FREE 7.0 ORTHOPEDIC 2D73ET70

## (undated) DEVICE — DRILL BIT 3.2X145MM  310.31

## (undated) DEVICE — GLOVE PROTEXIS W/NEU-THERA 8.5  2D73TE85

## (undated) DEVICE — SU VICRYL 0 CT-1 36" J346H

## (undated) DEVICE — PACK HIP NAILING SOP32HPFC4

## (undated) DEVICE — BAG CLEAR TRASH 1.3M 39X33" P4040C

## (undated) DEVICE — Device

## (undated) DEVICE — LINEN HALF SHEET 5512

## (undated) DEVICE — LINEN DRAPE 54X72" 5467

## (undated) DEVICE — DRAPE LEGGINGS 8421

## (undated) DEVICE — DRSG AQUACEL AG 3.5X6.0" HYDROFIBER 412010

## (undated) DEVICE — DRSG ABDOMINAL 07 1/2X8" 7197D

## (undated) DEVICE — SUCTION MANIFOLD NEPTUNE 2 SYS 4 PORT 0702-020-000

## (undated) DEVICE — SOL NACL 0.9% IRRIG 1000ML BOTTLE 2F7124

## (undated) DEVICE — GLOVE PROTEXIS W/NEU-THERA 7.0  2D73TE70

## (undated) DEVICE — LINEN FULL SHEET 5511

## (undated) DEVICE — SYR 30ML LL W/O NDL 302832

## (undated) DEVICE — DRAPE X-RAY TUBE 00-901169-01-OEC

## (undated) DEVICE — GLOVE PROTEXIS BLUE W/NEU-THERA 8.5  2D73EB85

## (undated) DEVICE — BLADE KNIFE SURG 15 371115

## (undated) DEVICE — GLOVE PROTEXIS POWDER FREE 8.5 ORTHOPEDIC 2D73ET85

## (undated) DEVICE — PREP CHLORAPREP 26ML TINTED ORANGE  260815

## (undated) RX ORDER — HYDROMORPHONE HYDROCHLORIDE 1 MG/ML
INJECTION, SOLUTION INTRAMUSCULAR; INTRAVENOUS; SUBCUTANEOUS
Status: DISPENSED
Start: 2020-02-02

## (undated) RX ORDER — CEFAZOLIN SODIUM 2 G/100ML
INJECTION, SOLUTION INTRAVENOUS
Status: DISPENSED
Start: 2020-02-02

## (undated) RX ORDER — FENTANYL CITRATE 50 UG/ML
INJECTION, SOLUTION INTRAMUSCULAR; INTRAVENOUS
Status: DISPENSED
Start: 2020-02-02

## (undated) RX ORDER — ACETAMINOPHEN 500 MG
TABLET ORAL
Status: DISPENSED
Start: 2020-02-02